# Patient Record
Sex: FEMALE | Race: WHITE | NOT HISPANIC OR LATINO | Employment: FULL TIME | URBAN - METROPOLITAN AREA
[De-identification: names, ages, dates, MRNs, and addresses within clinical notes are randomized per-mention and may not be internally consistent; named-entity substitution may affect disease eponyms.]

---

## 2017-06-21 ENCOUNTER — TRANSCRIBE ORDERS (OUTPATIENT)
Dept: LAB | Facility: CLINIC | Age: 37
End: 2017-06-21

## 2017-06-21 ENCOUNTER — APPOINTMENT (OUTPATIENT)
Dept: LAB | Facility: CLINIC | Age: 37
End: 2017-06-21
Payer: COMMERCIAL

## 2017-06-21 DIAGNOSIS — Z00.00 ROUTINE GENERAL MEDICAL EXAMINATION AT A HEALTH CARE FACILITY: ICD-10-CM

## 2017-06-21 DIAGNOSIS — Z13.9 SCREENING FOR CONDITION: ICD-10-CM

## 2017-06-21 DIAGNOSIS — Z11.4 SCREENING FOR HUMAN IMMUNODEFICIENCY VIRUS: ICD-10-CM

## 2017-06-21 DIAGNOSIS — Z13.6 SCREENING FOR ISCHEMIC HEART DISEASE: Primary | ICD-10-CM

## 2017-06-21 DIAGNOSIS — Z13.6 SCREENING FOR ISCHEMIC HEART DISEASE: ICD-10-CM

## 2017-06-21 LAB
ANION GAP SERPL CALCULATED.3IONS-SCNC: 10 MMOL/L (ref 4–13)
BUN SERPL-MCNC: 14 MG/DL (ref 5–25)
CALCIUM SERPL-MCNC: 9.1 MG/DL (ref 8.3–10.1)
CHLORIDE SERPL-SCNC: 108 MMOL/L (ref 100–108)
CHOLEST SERPL-MCNC: 122 MG/DL (ref 50–200)
CO2 SERPL-SCNC: 23 MMOL/L (ref 21–32)
CREAT SERPL-MCNC: 0.84 MG/DL (ref 0.6–1.3)
ERYTHROCYTE [DISTWIDTH] IN BLOOD BY AUTOMATED COUNT: 13.8 % (ref 11.6–15.1)
GFR SERPL CREATININE-BSD FRML MDRD: >60 ML/MIN/1.73SQ M
GLUCOSE P FAST SERPL-MCNC: 103 MG/DL (ref 65–99)
HCT VFR BLD AUTO: 44.8 % (ref 34.8–46.1)
HDLC SERPL-MCNC: 39 MG/DL (ref 40–60)
HGB BLD-MCNC: 14.3 G/DL (ref 11.5–15.4)
LDLC SERPL CALC-MCNC: 74 MG/DL (ref 0–100)
MCH RBC QN AUTO: 28.8 PG (ref 26.8–34.3)
MCHC RBC AUTO-ENTMCNC: 31.9 G/DL (ref 31.4–37.4)
MCV RBC AUTO: 90 FL (ref 82–98)
PLATELET # BLD AUTO: 292 THOUSANDS/UL (ref 149–390)
PMV BLD AUTO: 10.2 FL (ref 8.9–12.7)
POTASSIUM SERPL-SCNC: 3.9 MMOL/L (ref 3.5–5.3)
RBC # BLD AUTO: 4.97 MILLION/UL (ref 3.81–5.12)
SODIUM SERPL-SCNC: 141 MMOL/L (ref 136–145)
TRIGL SERPL-MCNC: 44 MG/DL
WBC # BLD AUTO: 7.22 THOUSAND/UL (ref 4.31–10.16)

## 2017-06-21 PROCEDURE — 36415 COLL VENOUS BLD VENIPUNCTURE: CPT

## 2017-06-21 PROCEDURE — 80061 LIPID PANEL: CPT

## 2017-06-21 PROCEDURE — 85027 COMPLETE CBC AUTOMATED: CPT

## 2017-06-21 PROCEDURE — 87389 HIV-1 AG W/HIV-1&-2 AB AG IA: CPT

## 2017-06-21 PROCEDURE — 80048 BASIC METABOLIC PNL TOTAL CA: CPT

## 2017-06-23 LAB — HIV 1+2 AB+HIV1 P24 AG SERPL QL IA: NORMAL

## 2017-12-23 ENCOUNTER — APPOINTMENT (OUTPATIENT)
Dept: LAB | Age: 37
End: 2017-12-23
Attending: PREVENTIVE MEDICINE

## 2017-12-23 ENCOUNTER — TRANSCRIBE ORDERS (OUTPATIENT)
Dept: ADMINISTRATIVE | Age: 37
End: 2017-12-23

## 2017-12-23 DIAGNOSIS — Z02.1 PRE-EMPLOYMENT EXAMINATION: Primary | ICD-10-CM

## 2017-12-23 DIAGNOSIS — Z02.1 PRE-EMPLOYMENT EXAMINATION: ICD-10-CM

## 2017-12-23 LAB — RUBV IGG SERPL IA-ACNC: 8.7 IU/ML

## 2017-12-23 PROCEDURE — 86765 RUBEOLA ANTIBODY: CPT

## 2017-12-23 PROCEDURE — 86480 TB TEST CELL IMMUN MEASURE: CPT

## 2017-12-23 PROCEDURE — 86735 MUMPS ANTIBODY: CPT

## 2017-12-23 PROCEDURE — 86762 RUBELLA ANTIBODY: CPT

## 2017-12-23 PROCEDURE — 36415 COLL VENOUS BLD VENIPUNCTURE: CPT

## 2017-12-23 PROCEDURE — 86787 VARICELLA-ZOSTER ANTIBODY: CPT

## 2017-12-26 LAB
ANNOTATION COMMENT IMP: NORMAL
GAMMA INTERFERON BACKGROUND BLD IA-ACNC: 0.13 IU/ML
M TB IFN-G BLD-IMP: NEGATIVE
M TB IFN-G CD4+ BCKGRND COR BLD-ACNC: <0.01 IU/ML
M TB IFN-G CD4+ T-CELLS BLD-ACNC: 0.05 IU/ML
MEV IGG SER QL: NORMAL
MITOGEN IGNF BLD-ACNC: >10 IU/ML
MUV IGG SER QL: NORMAL
QUANTIFERON-TB GOLD IN TUBE: NORMAL
SERVICE CMNT-IMP: NORMAL
VZV IGG SER IA-ACNC: NORMAL

## 2018-02-23 ENCOUNTER — OFFICE VISIT (OUTPATIENT)
Dept: FAMILY MEDICINE CLINIC | Facility: CLINIC | Age: 38
End: 2018-02-23
Payer: COMMERCIAL

## 2018-02-23 VITALS — HEART RATE: 72 BPM | DIASTOLIC BLOOD PRESSURE: 72 MMHG | SYSTOLIC BLOOD PRESSURE: 120 MMHG | RESPIRATION RATE: 16 BRPM

## 2018-02-23 DIAGNOSIS — E66.3 OVERWEIGHT: ICD-10-CM

## 2018-02-23 DIAGNOSIS — R06.02 SHORTNESS OF BREATH: Primary | ICD-10-CM

## 2018-02-23 PROCEDURE — 93000 ELECTROCARDIOGRAM COMPLETE: CPT | Performed by: FAMILY MEDICINE

## 2018-02-23 PROCEDURE — 99204 OFFICE O/P NEW MOD 45 MIN: CPT | Performed by: FAMILY MEDICINE

## 2018-02-23 NOTE — ASSESSMENT & PLAN NOTE
Jerica is stable on exam   She is to f/u here in 1 month, or sooner PRN  Pt has not been ill, and this does not appear to be pathologic from a CV/Pulm perspective  Suspect here more deconditioning and anxiousness  Precautions were given to the pt today that if the symptoms worsen, she is to call, or be taken to the ER   ECG today was overall reassuring  Discussed with the pt getting back to some regular, low impact exercise  We will check FBW as well

## 2018-02-23 NOTE — ASSESSMENT & PLAN NOTE
We discussed options at length today  We will start her on Contrave, and see how she does  Pt is to work on a lower carb diet, and regular exercise, to assist with weight loss  FBW - with TSH and CBC included - was ordered

## 2018-02-23 NOTE — PROGRESS NOTES
Assessment/Plan:    Shortness of breath  Crystal is stable on exam   She is to f/u here in 1 month, or sooner PRN  Pt has not been ill, and this does not appear to be pathologic from a CV/Pulm perspective  Suspect here more deconditioning and anxiousness  Precautions were given to the pt today that if the symptoms worsen, she is to call, or be taken to the ER   ECG today was overall reassuring  Discussed with the pt getting back to some regular, low impact exercise  We will check FBW as well  Overweight  We discussed options at length today  We will start her on Contrave, and see how she does  Pt is to work on a lower carb diet, and regular exercise, to assist with weight loss  FBW - with TSH and CBC included - was ordered  Diagnoses and all orders for this visit:    Shortness of breath  -     CBC and differential; Future  -     Comprehensive metabolic panel; Future  -     Lipid panel; Future  -     TSH, 3rd generation with T4 reflex; Future  -     POCT ECG    Overweight  -     CBC and differential; Future  -     Comprehensive metabolic panel; Future  -     Lipid panel; Future  -     TSH, 3rd generation with T4 reflex; Future  -     Discontinue: Naltrexone-Bupropion HCl ER (CONTRAVE) 8-90 MG TB12; Take 1 tab in the morning x 1 week, then 1 tab twice daily for a week, then 2 tabs in the AM and 1 tab in the PM for a week, then 2 tabs twice daily after that (and maintain)  -     Discontinue: Naltrexone-Bupropion HCl ER (CONTRAVE) 8-90 MG TB12; Take 1 tab in the morning x 1 week, then 1 tab twice daily for a week, then 2 tabs in the AM and 1 tab in the PM for a week, then 2 tabs twice daily after that (and maintain)  -     Naltrexone-Bupropion HCl ER (CONTRAVE) 8-90 MG TB12; Take 1 tab in the AM x 1 week, then 1 tab twice daily x 1 week, then 2 tabs in the AM / 1 tab in the PM x 1 week, then 2 tabs twice daily  Subjective:      Patient ID: Bro Irizarry is a 30 y o  female      Pt has struggled with weight loss for some time - did well with Phentiramine and Topamax in the past, but struggled with side effects on Phentiramine (heart racing, back pain, etc)  Topamax changed mood adversely  Exercising has lapsed in recent months  Diet also off again  Pt with c/o of SOB over the last 3 days, worse at night -   Non-smoker  Strong family hx of CAD  No personal or famhx of asthma  No recent illnesses  Pt is not pregnant - partner with vasectomy  Has 1 child  The following portions of the patient's history were reviewed and updated as appropriate: allergies, current medications, past family history, past social history, past surgical history and problem list     Past Medical History:   Diagnosis Date    SOB (shortness of breath)      No past surgical history on file  Current Outpatient Prescriptions:     Naltrexone-Bupropion HCl ER (CONTRAVE) 8-90 MG TB12, Take 1 tab in the AM x 1 week, then 1 tab twice daily x 1 week, then 2 tabs in the AM / 1 tab in the PM x 1 week, then 2 tabs twice daily  , Disp: 120 tablet, Rfl: 2    Allergies   Allergen Reactions    Azithromycin Other (See Comments)     other    Cephalexin Other (See Comments)     Other      Codeine Hives    Promethazine Other (See Comments)     Other      Sulfamethoxazole-Trimethoprim Hives     Family History   Problem Relation Age of Onset    Diabetes Mother     Hypertension Mother      Social History     Social History    Marital status: /Civil Union     Spouse name: N/A    Number of children: N/A    Years of education: N/A     Occupational History    Not on file       Social History Main Topics    Smoking status: Never Smoker    Smokeless tobacco: Never Used    Alcohol use Yes      Comment: social    Drug use: No    Sexual activity: Yes     Partners: Male     Birth control/ protection: Male Sterilization     Other Topics Concern    Not on file     Social History Narrative    No narrative on file         Review of Systems   Constitutional: Negative for fever  HENT: Negative for congestion and rhinorrhea  Respiratory: Positive for chest tightness and shortness of breath  Mild wheeze  Mild cough last night only  Cardiovascular: Positive for chest pain  Negative for palpitations  Mild CP  Gastrointestinal: Negative for abdominal pain and blood in stool  No heartburn  Psychiatric/Behavioral: The patient is nervous/anxious  Objective:      /72 (BP Location: Left arm, Patient Position: Sitting, Cuff Size: Standard)   Pulse 72   Resp 16          Physical Exam   Constitutional: She is oriented to person, place, and time  She appears well-developed and well-nourished  No distress  HENT:   Head: Normocephalic and atraumatic  Right Ear: Hearing, tympanic membrane, external ear and ear canal normal    Left Ear: Hearing, tympanic membrane, external ear and ear canal normal    Mouth/Throat: Oropharynx is clear and moist  No oropharyngeal exudate  Eyes: Conjunctivae are normal    Neck: Normal range of motion  Neck supple  No thyromegaly present  Cardiovascular: Normal rate, regular rhythm and normal heart sounds  Exam reveals no gallop and no friction rub  No murmur heard  Pulmonary/Chest: Effort normal and breath sounds normal  No respiratory distress  She has no wheezes  She has no rales  Abdominal: Soft  Bowel sounds are normal  She exhibits no distension and no mass  There is no tenderness  There is no rebound and no guarding  Musculoskeletal: She exhibits no edema  Legs:  Lymphadenopathy:     She has no cervical adenopathy  Neurological: She is alert and oriented to person, place, and time  Skin: She is not diaphoretic  Psychiatric: She has a normal mood and affect  Her behavior is normal  Judgment and thought content normal    Nursing note and vitals reviewed

## 2018-04-17 ENCOUNTER — APPOINTMENT (OUTPATIENT)
Dept: LAB | Facility: HOSPITAL | Age: 38
End: 2018-04-17
Payer: COMMERCIAL

## 2018-04-17 ENCOUNTER — TRANSCRIBE ORDERS (OUTPATIENT)
Dept: LAB | Facility: HOSPITAL | Age: 38
End: 2018-04-17

## 2018-04-17 DIAGNOSIS — E66.3 OVERWEIGHT: ICD-10-CM

## 2018-04-17 DIAGNOSIS — Z01.812 PRE-OPERATIVE LABORATORY EXAMINATION: Primary | ICD-10-CM

## 2018-04-17 DIAGNOSIS — Z01.812 PRE-OPERATIVE LABORATORY EXAMINATION: ICD-10-CM

## 2018-04-17 DIAGNOSIS — R06.02 SHORTNESS OF BREATH: ICD-10-CM

## 2018-04-17 LAB
ALBUMIN SERPL BCP-MCNC: 3.6 G/DL (ref 3.5–5)
ALP SERPL-CCNC: 85 U/L (ref 46–116)
ALT SERPL W P-5'-P-CCNC: 15 U/L (ref 12–78)
ANION GAP SERPL CALCULATED.3IONS-SCNC: 8 MMOL/L (ref 4–13)
APTT PPP: 26 SECONDS (ref 23–35)
AST SERPL W P-5'-P-CCNC: 11 U/L (ref 5–45)
BASOPHILS # BLD AUTO: 0.02 THOUSANDS/ΜL (ref 0–0.1)
BASOPHILS NFR BLD AUTO: 0 % (ref 0–1)
BILIRUB SERPL-MCNC: 0.45 MG/DL (ref 0.2–1)
BUN SERPL-MCNC: 16 MG/DL (ref 5–25)
CALCIUM SERPL-MCNC: 8.8 MG/DL (ref 8.3–10.1)
CHLORIDE SERPL-SCNC: 109 MMOL/L (ref 100–108)
CHOLEST SERPL-MCNC: 147 MG/DL (ref 50–200)
CO2 SERPL-SCNC: 24 MMOL/L (ref 21–32)
CREAT SERPL-MCNC: 0.68 MG/DL (ref 0.6–1.3)
EOSINOPHIL # BLD AUTO: 0.19 THOUSAND/ΜL (ref 0–0.61)
EOSINOPHIL NFR BLD AUTO: 2 % (ref 0–6)
ERYTHROCYTE [DISTWIDTH] IN BLOOD BY AUTOMATED COUNT: 13.2 % (ref 11.6–15.1)
GFR SERPL CREATININE-BSD FRML MDRD: 114 ML/MIN/1.73SQ M
GLUCOSE P FAST SERPL-MCNC: 89 MG/DL (ref 65–99)
HBV SURFACE AG SER QL: NORMAL
HCT VFR BLD AUTO: 45.8 % (ref 34.8–46.1)
HCV AB SER QL: NORMAL
HDLC SERPL-MCNC: 49 MG/DL (ref 40–60)
HGB BLD-MCNC: 14.4 G/DL (ref 11.5–15.4)
INR PPP: 0.99 (ref 0.86–1.16)
LDLC SERPL CALC-MCNC: 83 MG/DL (ref 0–100)
LYMPHOCYTES # BLD AUTO: 2.8 THOUSANDS/ΜL (ref 0.6–4.47)
LYMPHOCYTES NFR BLD AUTO: 34 % (ref 14–44)
MCH RBC QN AUTO: 29.6 PG (ref 26.8–34.3)
MCHC RBC AUTO-ENTMCNC: 31.4 G/DL (ref 31.4–37.4)
MCV RBC AUTO: 94 FL (ref 82–98)
MONOCYTES # BLD AUTO: 0.54 THOUSAND/ΜL (ref 0.17–1.22)
MONOCYTES NFR BLD AUTO: 7 % (ref 4–12)
NEUTROPHILS # BLD AUTO: 4.64 THOUSANDS/ΜL (ref 1.85–7.62)
NEUTS SEG NFR BLD AUTO: 57 % (ref 43–75)
NONHDLC SERPL-MCNC: 98 MG/DL
NRBC BLD AUTO-RTO: 0 /100 WBCS
PLATELET # BLD AUTO: 293 THOUSANDS/UL (ref 149–390)
PMV BLD AUTO: 10 FL (ref 8.9–12.7)
POTASSIUM SERPL-SCNC: 3.7 MMOL/L (ref 3.5–5.3)
PROT SERPL-MCNC: 7.6 G/DL (ref 6.4–8.2)
PROTHROMBIN TIME: 13.1 SECONDS (ref 12.1–14.4)
RBC # BLD AUTO: 4.86 MILLION/UL (ref 3.81–5.12)
SODIUM SERPL-SCNC: 141 MMOL/L (ref 136–145)
TRIGL SERPL-MCNC: 76 MG/DL
TSH SERPL DL<=0.05 MIU/L-ACNC: 2.76 UIU/ML (ref 0.36–3.74)
WBC # BLD AUTO: 8.21 THOUSAND/UL (ref 4.31–10.16)

## 2018-04-17 PROCEDURE — 80061 LIPID PANEL: CPT

## 2018-04-17 PROCEDURE — 85025 COMPLETE CBC W/AUTO DIFF WBC: CPT

## 2018-04-17 PROCEDURE — 85610 PROTHROMBIN TIME: CPT

## 2018-04-17 PROCEDURE — 87389 HIV-1 AG W/HIV-1&-2 AB AG IA: CPT

## 2018-04-17 PROCEDURE — 84443 ASSAY THYROID STIM HORMONE: CPT

## 2018-04-17 PROCEDURE — 86803 HEPATITIS C AB TEST: CPT

## 2018-04-17 PROCEDURE — 85730 THROMBOPLASTIN TIME PARTIAL: CPT

## 2018-04-17 PROCEDURE — 36415 COLL VENOUS BLD VENIPUNCTURE: CPT

## 2018-04-17 PROCEDURE — 87340 HEPATITIS B SURFACE AG IA: CPT

## 2018-04-17 PROCEDURE — 80053 COMPREHEN METABOLIC PANEL: CPT

## 2018-04-18 LAB — HIV 1+2 AB+HIV1 P24 AG SERPL QL IA: NORMAL

## 2018-08-09 ENCOUNTER — APPOINTMENT (OUTPATIENT)
Dept: LAB | Facility: HOSPITAL | Age: 38
End: 2018-08-09

## 2018-08-09 ENCOUNTER — TRANSCRIBE ORDERS (OUTPATIENT)
Dept: LAB | Facility: HOSPITAL | Age: 38
End: 2018-08-09

## 2018-08-09 DIAGNOSIS — Z00.8 HEALTH EXAMINATION IN POPULATION SURVEY: Primary | ICD-10-CM

## 2018-08-09 DIAGNOSIS — Z00.8 HEALTH EXAMINATION IN POPULATION SURVEY: ICD-10-CM

## 2018-08-09 LAB
CHOLEST SERPL-MCNC: 159 MG/DL (ref 50–200)
EST. AVERAGE GLUCOSE BLD GHB EST-MCNC: 111 MG/DL
HBA1C MFR BLD: 5.5 % (ref 4.2–6.3)
HDLC SERPL-MCNC: 51 MG/DL (ref 40–60)
LDLC SERPL CALC-MCNC: 86 MG/DL (ref 0–100)
NONHDLC SERPL-MCNC: 108 MG/DL
TRIGL SERPL-MCNC: 111 MG/DL

## 2018-08-09 PROCEDURE — 80061 LIPID PANEL: CPT

## 2018-08-09 PROCEDURE — 36415 COLL VENOUS BLD VENIPUNCTURE: CPT

## 2018-08-09 PROCEDURE — 83036 HEMOGLOBIN GLYCOSYLATED A1C: CPT

## 2018-08-15 ENCOUNTER — APPOINTMENT (OUTPATIENT)
Dept: LAB | Facility: CLINIC | Age: 38
End: 2018-08-15
Payer: COMMERCIAL

## 2018-08-15 ENCOUNTER — OFFICE VISIT (OUTPATIENT)
Dept: FAMILY MEDICINE CLINIC | Facility: CLINIC | Age: 38
End: 2018-08-15
Payer: COMMERCIAL

## 2018-08-15 VITALS
RESPIRATION RATE: 16 BRPM | WEIGHT: 199.8 LBS | HEART RATE: 65 BPM | SYSTOLIC BLOOD PRESSURE: 112 MMHG | DIASTOLIC BLOOD PRESSURE: 72 MMHG

## 2018-08-15 DIAGNOSIS — J32.0 MAXILLARY SINUSITIS, UNSPECIFIED CHRONICITY: ICD-10-CM

## 2018-08-15 DIAGNOSIS — N20.0 NEPHROLITHIASIS: Chronic | ICD-10-CM

## 2018-08-15 DIAGNOSIS — E66.3 OVERWEIGHT: Chronic | ICD-10-CM

## 2018-08-15 DIAGNOSIS — J32.0 MAXILLARY SINUSITIS, UNSPECIFIED CHRONICITY: Primary | ICD-10-CM

## 2018-08-15 PROCEDURE — 99214 OFFICE O/P EST MOD 30 MIN: CPT | Performed by: FAMILY MEDICINE

## 2018-08-15 PROCEDURE — 82360 CALCULUS ASSAY QUANT: CPT | Performed by: FAMILY MEDICINE

## 2018-08-15 RX ORDER — AMOXICILLIN AND CLAVULANATE POTASSIUM 875; 125 MG/1; MG/1
1 TABLET, FILM COATED ORAL 2 TIMES DAILY WITH MEALS
Qty: 20 TABLET | Refills: 0 | Status: SHIPPED | OUTPATIENT
Start: 2018-08-15 | End: 2018-08-25

## 2018-08-15 RX ORDER — AMOXICILLIN AND CLAVULANATE POTASSIUM 875; 125 MG/1; MG/1
1 TABLET, FILM COATED ORAL 2 TIMES DAILY WITH MEALS
Qty: 20 TABLET | Refills: 0 | Status: SHIPPED | OUTPATIENT
Start: 2018-08-15 | End: 2018-08-15 | Stop reason: SDUPTHER

## 2018-08-15 NOTE — PROGRESS NOTES
Assessment/Plan:    No problem-specific Assessment & Plan notes found for this encounter  Diagnoses and all orders for this visit:    Maxillary sinusitis, unspecified chronicity  Comments:  Pt is stable on exam - Treating with Augmentin (has taken and tolerated in the past), OTC Cough and Cold Preps PRN, rest, and good PO hydration  Orders:  -     amoxicillin-clavulanate (AUGMENTIN) 875-125 mg per tablet; Take 1 tablet by mouth 2 (two) times a day with meals for 10 days    Overweight  Comments:  Pt is to continue a healthier diet, and regular exercise  Will start her back on Phentermine - discussed potential R/SE of med  [place] PDMP was checked  Orders:  -     Phentermine HCl 15 MG TBDP; Take 1 tablet (15 mg total) by mouth daily in the early morning    Nephrolithiasis  Comments:  Stone analysis ordered - pt has stone  She is to continue to hydrate well, and mix in some lemon water  Orders:  -     Stone analysis          Subjective:      Patient ID: Penelope Barbosa is a 30 y o  female  Passed her third kidney stone last month on vacation - has stone with her now  Sinus issues kicking up over the last few day; +sinus pressure, and has a sick contact  Going to the gym - still not losing weight  Pt had no improvement with Contrave  Had normal TSH (2 76) in 04/2018  Pt is not pregnant  Sinus Problem   Associated symptoms include congestion  Pertinent negatives include no coughing  The following portions of the patient's history were reviewed and updated as appropriate: allergies, current medications, past family history, past social history, past surgical history and problem list     Past Medical History:   Diagnosis Date    SOB (shortness of breath)      Scheduled Meds:  Continuous Infusions:  No current facility-administered medications for this visit  PRN Meds:      Allergies   Allergen Reactions    Azithromycin Other (See Comments)     other    Cephalexin Other (See Comments) Other      Codeine Hives    Promethazine Other (See Comments)     Other      Sulfamethoxazole-Trimethoprim Hives       Review of Systems   Constitutional: Positive for fever and unexpected weight change  Negative for activity change  Continued issues with losing weight  HENT: Positive for congestion, rhinorrhea and sinus pain  Respiratory: Negative for cough  Genitourinary: Negative for dysuria and hematuria  Musculoskeletal:        Still some residual back pain  Objective:      /72 (BP Location: Right arm, Patient Position: Sitting, Cuff Size: Standard)   Pulse 65   Resp 16   Wt 90 6 kg (199 lb 12 8 oz)          Physical Exam   Constitutional: She is oriented to person, place, and time  She appears well-developed and well-nourished  No distress  HENT:   Head: Normocephalic and atraumatic  Right Ear: Hearing, tympanic membrane, external ear and ear canal normal    Left Ear: Hearing, tympanic membrane, external ear and ear canal normal    Nose: Mucosal edema present  Right sinus exhibits maxillary sinus tenderness  Left sinus exhibits maxillary sinus tenderness  Mouth/Throat: Oropharynx is clear and moist  No oropharyngeal exudate  Eyes: Conjunctivae are normal    Neck: Normal range of motion  Neck supple  No thyromegaly present  Cardiovascular: Normal rate, regular rhythm and normal heart sounds  Exam reveals no gallop and no friction rub  No murmur heard  Pulmonary/Chest: Effort normal and breath sounds normal  No respiratory distress  She has no wheezes  She has no rales  Abdominal: Soft  Bowel sounds are normal  She exhibits no distension and no mass  There is no tenderness  There is no rebound, no guarding and no CVA tenderness  Lymphadenopathy:     She has no cervical adenopathy  Neurological: She is alert and oriented to person, place, and time  Skin: She is not diaphoretic  Psychiatric: She has a normal mood and affect   Her behavior is normal  Judgment and thought content normal    Nursing note and vitals reviewed

## 2018-08-16 DIAGNOSIS — E66.3 OVERWEIGHT: Primary | ICD-10-CM

## 2018-08-16 RX ORDER — TOPIRAMATE 50 MG/1
50 TABLET, FILM COATED ORAL EVERY 12 HOURS SCHEDULED
Qty: 180 TABLET | Refills: 1 | Status: SHIPPED | OUTPATIENT
Start: 2018-08-16 | End: 2018-08-16 | Stop reason: SDUPTHER

## 2018-08-16 RX ORDER — TOPIRAMATE 50 MG/1
50 TABLET, FILM COATED ORAL EVERY 12 HOURS SCHEDULED
Qty: 180 TABLET | Refills: 0 | Status: SHIPPED | OUTPATIENT
Start: 2018-08-16 | End: 2018-10-10 | Stop reason: ALTCHOICE

## 2018-08-16 NOTE — TELEPHONE ENCOUNTER
The patient was taking 50mg twice daily & would like to stay with that  She feels the 25mgs won't do enough  Please send the Rx for 50mgs tablets, # 90  to [facility]   NOT [facility]  Thank you!

## 2018-08-16 NOTE — TELEPHONE ENCOUNTER
Also, can someone please call [facility] & give them the OK to fill the Generic Phentermine as our insurance does not cover it  Thank you!

## 2018-08-21 LAB
CA PHOS MFR STONE: 5 %
CALCIUM OXALATE DIHYDRATE MFR STONE IR: 5 %
COLOR STONE: NORMAL
COM MFR STONE: 90 %
COMMENT-STONE3: NORMAL
COMPOSITION: NORMAL
LABORATORY COMMENT REPORT: NORMAL
NIDUS STONE QL: NORMAL
PHOTO: NORMAL
SIZE STONE: NORMAL MM
STONE ANALYSIS-IMP: NORMAL
WT STONE: 6 MG

## 2018-08-22 NOTE — PROGRESS NOTES
Please let the patient know that their kidney stone was the more typical type, Calcium Oxolate  Thanks     Dr Fabiola Farmer

## 2018-10-10 ENCOUNTER — OFFICE VISIT (OUTPATIENT)
Dept: FAMILY MEDICINE CLINIC | Facility: CLINIC | Age: 38
End: 2018-10-10
Payer: COMMERCIAL

## 2018-10-10 VITALS
OXYGEN SATURATION: 98 % | RESPIRATION RATE: 16 BRPM | DIASTOLIC BLOOD PRESSURE: 76 MMHG | WEIGHT: 202.2 LBS | TEMPERATURE: 98.7 F | SYSTOLIC BLOOD PRESSURE: 110 MMHG | HEART RATE: 70 BPM

## 2018-10-10 DIAGNOSIS — F41.8 SITUATIONAL ANXIETY: ICD-10-CM

## 2018-10-10 DIAGNOSIS — A08.4 VIRAL GASTROENTERITIS: Primary | ICD-10-CM

## 2018-10-10 PROCEDURE — 99213 OFFICE O/P EST LOW 20 MIN: CPT | Performed by: FAMILY MEDICINE

## 2018-10-10 RX ORDER — ALPRAZOLAM 0.5 MG/1
0.5 TABLET ORAL 2 TIMES DAILY PRN
Qty: 10 TABLET | Refills: 0 | Status: SHIPPED | OUTPATIENT
Start: 2018-10-10 | End: 2019-01-03 | Stop reason: ALTCHOICE

## 2018-10-10 NOTE — PROGRESS NOTES
Assessment/Plan:    No problem-specific Assessment & Plan notes found for this encounter  Diagnoses and all orders for this visit:    Viral gastroenteritis  Comments:  Likely VGE, that has been in the community  Pt to make diet bland for a couple days, and hydrate well  Note given for work  OTC Zantac PRN  Situational anxiety  Comments:  Pt upset with sudden passing of close relative  Small script for PRN Alprazolam ordered  [place]MP [place]  Orders:  -     ALPRAZolam (XANAX) 0 5 mg tablet; Take 1 tablet (0 5 mg total) by mouth 2 (two) times a day as needed for anxiety or sleep for up to 5 days          Subjective:      Patient ID: Barry Turner is a 41 y o  female  Crystal had a death in the family (suddenly lost her uncle; complication post-surgery)  Wakes up sweating, nauseated, no diarrhea  No vomiting  Had been feverish; resolved  Trouble sleeping  Pt is not pregnant  No clear sick contacts  The following portions of the patient's history were reviewed and updated as appropriate: allergies, current medications, past family history, past social history, past surgical history and problem list     Past Medical History:   Diagnosis Date    SOB (shortness of breath)        Current Outpatient Prescriptions:     ALPRAZolam (XANAX) 0 5 mg tablet, Take 1 tablet (0 5 mg total) by mouth 2 (two) times a day as needed for anxiety or sleep for up to 5 days, Disp: 10 tablet, Rfl: 0    Allergies   Allergen Reactions    Azithromycin Other (See Comments)     other    Cephalexin Other (See Comments)     Other      Codeine Hives    Promethazine Other (See Comments)     Other      Sulfamethoxazole-Trimethoprim Hives       Review of Systems   Constitutional: Negative for activity change and fever  HENT: Positive for congestion  Negative for rhinorrhea  Respiratory: Negative for cough and shortness of breath  Cardiovascular: Negative for chest pain     Gastrointestinal: Positive for nausea  Negative for diarrhea and vomiting  Genitourinary: Negative for dysuria and hematuria  Objective:      /76 (BP Location: Left arm, Patient Position: Sitting, Cuff Size: Standard)   Pulse 70   Temp 98 7 °F (37 1 °C) (Tympanic)   Resp 16   Wt 91 7 kg (202 lb 3 2 oz)   SpO2 98%          Physical Exam   Constitutional: She is oriented to person, place, and time  She appears well-developed and well-nourished  No distress  HENT:   Head: Normocephalic and atraumatic  Right Ear: Hearing, tympanic membrane, external ear and ear canal normal    Left Ear: Hearing, tympanic membrane, external ear and ear canal normal    Mouth/Throat: Oropharynx is clear and moist  No oropharyngeal exudate  Eyes: Conjunctivae are normal    Neck: Normal range of motion  Neck supple  No thyromegaly present  Cardiovascular: Normal rate, regular rhythm and normal heart sounds  Exam reveals no gallop and no friction rub  No murmur heard  Pulmonary/Chest: Effort normal and breath sounds normal  No respiratory distress  She has no wheezes  She has no rales  Abdominal: Soft  Bowel sounds are normal  She exhibits no distension and no mass  There is no tenderness  There is no rebound and no guarding  Lymphadenopathy:     She has no cervical adenopathy  Neurological: She is alert and oriented to person, place, and time  Skin: She is not diaphoretic  Psychiatric: She has a normal mood and affect  Her behavior is normal  Judgment and thought content normal    Nursing note and vitals reviewed

## 2018-11-05 ENCOUNTER — OFFICE VISIT (OUTPATIENT)
Dept: OBGYN CLINIC | Facility: CLINIC | Age: 38
End: 2018-11-05
Payer: COMMERCIAL

## 2018-11-05 VITALS
WEIGHT: 204.8 LBS | DIASTOLIC BLOOD PRESSURE: 72 MMHG | BODY MASS INDEX: 31.04 KG/M2 | HEIGHT: 68 IN | SYSTOLIC BLOOD PRESSURE: 124 MMHG

## 2018-11-05 DIAGNOSIS — Z12.4 CERVICAL CANCER SCREENING: ICD-10-CM

## 2018-11-05 DIAGNOSIS — Z01.419 WELL WOMAN EXAM WITH ROUTINE GYNECOLOGICAL EXAM: ICD-10-CM

## 2018-11-05 DIAGNOSIS — N93.9 ABNORMAL UTERINE BLEEDING: Primary | ICD-10-CM

## 2018-11-05 DIAGNOSIS — Z11.51 SCREENING FOR HPV (HUMAN PAPILLOMAVIRUS): ICD-10-CM

## 2018-11-05 PROCEDURE — G0145 SCR C/V CYTO,THINLAYER,RESCR: HCPCS | Performed by: OBSTETRICS & GYNECOLOGY

## 2018-11-05 PROCEDURE — 87624 HPV HI-RISK TYP POOLED RSLT: CPT | Performed by: OBSTETRICS & GYNECOLOGY

## 2018-11-05 PROCEDURE — 99385 PREV VISIT NEW AGE 18-39: CPT | Performed by: OBSTETRICS & GYNECOLOGY

## 2018-11-05 NOTE — PROGRESS NOTES
ASSESSMENT & PLAN: Sandra Duff is a 41 y o  [id number] with normal gynecologic exam     1   Routine well woman exam done today  2    Pap and HPV:Pap with HPV was done today  Current ASCCP Guidelines reviewed  3   The patient declined STD testing  Safe sex practices have been discussed  4  The patient is sexually active  She declined contraception and options have been discussed  Her  is s/p vasectomy  5  AUB: Intermenstrual bleeding x 3 cycles  Uterus bulky on exam today  TVUS ordered  Pap collected  6  The following were reviewed in today's visit: breast self exam, adequate intake of calcium and vitamin D, exercise and healthy diet  5  Patient to return to office in 3 months for AUB follow up  All questions have been answered to her satisfaction  CC:  Annual Gynecologic Examination    HPI: Sandra Duff is a 41 y o  [id number] who presents for annual gynecologic examination  She has the following concerns: irregular periods  Over the past 2 cycles she has had bleeding in between her periods which has not happened in the past  She is keeping track of her dates and they are as follows:  Menses started 8/29 x 6 days then had spotting on 9/14 requiring pantyliner  Next menses was 9/26 - 10/2 then had spotting on 10/12-13 again requiring pantyliner  LMP 10/24-10/29  Heaviest day is on day 3 of the cycle with passage of large clots  Endorses significant dysmenorrhea, worse on day 3, improves with motrin and heating pad use  Denies bleeding after sex but endorses sometimes feeling vaginal irritation  She also notices pain around the time of ovulation that is crampy, dull and constant  This pain is not nearly as bad as her dysmenorrhea  Health Maintenance:    She exercises 0  days per week  She wears her seatbelt routinely  She does perform regular monthly self breast exams  She feels safe at home  She does follow a balanced diet      She does not use tobacco    Past Medical History:   Diagnosis Date    SOB (shortness of breath)        History reviewed  No pertinent surgical history  Past OB/Gyn History:  Period Cycle (Days): 28  Period Duration (Days): 7  Period Pattern: Regular  Menstrual Flow: Light, Heavy  Menstrual Control: Tampon  Menstrual Control Change Freq (Hours): 2  Dysmenorrhea: (!) Severe  Dysmenorrhea Symptoms: Cramping, Nausea, Diarrhea, HeadacheNo LMP recorded  Patient is currently sexually active: heterosexual  Birth control:vasectomy    Last Pap  2016 :  no abnormalities    Obstetric History       T1      L1     SAB0   TAB2   Ectopic0   Multiple0   Live Births1       # Outcome Date GA Lbr Corky/2nd Weight Sex Delivery Anes PTL Lv   3 Term      Vag-Spont   POLO   2 TAB            1 TAB                   Family History:  Family History   Problem Relation Age of Onset    Diabetes Mother     Hypertension Mother        Social History:  Social History     Social History    Marital status: /Civil Union     Spouse name: N/A    Number of children: N/A    Years of education: N/A     Occupational History    Not on file  Social History Main Topics    Smoking status: Never Smoker    Smokeless tobacco: Never Used    Alcohol use Yes      Comment: social    Drug use: No    Sexual activity: Yes     Partners: Male     Birth control/ protection: Male Sterilization     Other Topics Concern    Not on file     Social History Narrative    No narrative on file     Presently lives with her  and child  Patient is   Patient is currently employed - [employer]   Allergies:   Allergies   Allergen Reactions    Azithromycin Other (See Comments)     other    Cephalexin Other (See Comments)     Other      Codeine Hives    Promethazine Other (See Comments)     Other      Sulfamethoxazole-Trimethoprim Hives       Medications:    Current Outpatient Prescriptions:     ALPRAZolam (XANAX) 0 5 mg tablet, Take 1 tablet (0 5 mg total) by mouth 2 (two) times a day as needed for anxiety or sleep for up to 5 days, Disp: 10 tablet, Rfl: 0    Review of Systems:  Denies fevers, chills, unintentional weight loss, excessive fatigue, chest pain, shortness of breath, abdominal pain, nausea, vomiting, urinary incontinence, urinary frequency, vaginal bleeding, vaginal discharge  All other systems negative unless otherwise stated  Physical Exam:  /72 (BP Location: Right arm, Patient Position: Sitting, Cuff Size: Large)   Ht 5' 8" (1 727 m)   Wt 92 9 kg (204 lb 12 8 oz)   BMI 31 14 kg/m²  Body mass index is 31 14 kg/m²  GEN: The patient was alert and oriented x3, pleasant well-appearing female in no acute distress  HEENT:  Unremarkable, no anterior or posterior lymphadenopathy, no thyromegaly  CV:  Regular rate and rhythm, normal S1 and S2, no murmurs  RESP:  Clear to auscultation bilaterally, no wheezes, rales or rhonchi  BREAST:  Symmetric breasts with no palpable breast masses or obvious breast lesions  She has no retractions or nipple discharge  She has no axillary abnormalities or palpable masses  GI:  Soft, nontender, non-distended  MSK: bilateral lower extremities are nontender, no edema  : Normal appearing external female genitalia, normal appearing urethral meatus  On sterile speculum exam, normal appearing vaginal epithelium, no vaginal discharge, no bleeding, grossly normal appearing cervix  On bimanual exam, no cervical motion tenderness; uterus is smooth, retroverted, mobile, nontender 10-12 weeks size  No tenderness or fullness in the bilateral adnexa

## 2018-11-09 LAB
HPV HR 12 DNA CVX QL NAA+PROBE: NEGATIVE
HPV16 DNA CVX QL NAA+PROBE: NEGATIVE
HPV18 DNA CVX QL NAA+PROBE: NEGATIVE

## 2018-11-12 LAB
LAB AP GYN PRIMARY INTERPRETATION: NORMAL
Lab: NORMAL

## 2019-01-03 ENCOUNTER — TELEPHONE (OUTPATIENT)
Dept: FAMILY MEDICINE CLINIC | Facility: CLINIC | Age: 39
End: 2019-01-03

## 2019-01-03 ENCOUNTER — OFFICE VISIT (OUTPATIENT)
Dept: FAMILY MEDICINE CLINIC | Facility: CLINIC | Age: 39
End: 2019-01-03
Payer: COMMERCIAL

## 2019-01-03 VITALS
SYSTOLIC BLOOD PRESSURE: 114 MMHG | HEIGHT: 68 IN | TEMPERATURE: 100 F | RESPIRATION RATE: 18 BRPM | DIASTOLIC BLOOD PRESSURE: 74 MMHG | BODY MASS INDEX: 32.28 KG/M2 | OXYGEN SATURATION: 96 % | HEART RATE: 99 BPM | WEIGHT: 213 LBS

## 2019-01-03 DIAGNOSIS — R68.89 FLU-LIKE SYMPTOMS: Primary | ICD-10-CM

## 2019-01-03 PROBLEM — Z83.3 FAMILY HISTORY OF TYPE 2 DIABETES MELLITUS: Status: ACTIVE | Noted: 2017-06-23

## 2019-01-03 PROBLEM — R06.02 SHORTNESS OF BREATH: Status: RESOLVED | Noted: 2018-02-23 | Resolved: 2019-01-03

## 2019-01-03 PROCEDURE — 99213 OFFICE O/P EST LOW 20 MIN: CPT | Performed by: FAMILY MEDICINE

## 2019-01-03 PROCEDURE — 3008F BODY MASS INDEX DOCD: CPT | Performed by: FAMILY MEDICINE

## 2019-01-03 PROCEDURE — 1036F TOBACCO NON-USER: CPT | Performed by: FAMILY MEDICINE

## 2019-01-03 RX ORDER — OSELTAMIVIR PHOSPHATE 75 MG/1
75 CAPSULE ORAL 2 TIMES DAILY
Qty: 10 CAPSULE | Refills: 0 | Status: SHIPPED | OUTPATIENT
Start: 2019-01-03 | End: 2019-01-08

## 2019-01-03 NOTE — TELEPHONE ENCOUNTER
Jerica's  has the flu & now she has symptoms  We had no sick appts so I overbooked a Phyiscal?  Is that OK? If not I will cancel her  Thank you!

## 2019-01-03 NOTE — PROGRESS NOTES
Assessment/Plan:    No problem-specific Assessment & Plan notes found for this encounter  Diagnoses and all orders for this visit:    Flu-like symptoms  -     oseltamivir (TAMIFLU) 75 mg capsule; Take 1 capsule (75 mg total) by mouth 2 (two) times a day for 5 days      patient doesn't want to be swabbed for flu  Will treat based on clinical symptoms   Work note given     Subjective:      Patient ID: Devante Jennings is a 41 y o  female  URI    This is a new problem  The current episode started in the past 7 days  The maximum temperature recorded prior to her arrival was 101 - 101 9 F  Associated symptoms include congestion, coughing and headaches  Pertinent negatives include no abdominal pain, chest pain, diarrhea, dysuria, ear pain, joint pain, joint swelling, nausea, neck pain, plugged ear sensation, rash, rhinorrhea, sinus pain, sneezing, sore throat, swollen glands, vomiting or wheezing  She has tried nothing for the symptoms  The treatment provided mild relief    + exposure to flu - her      The following portions of the patient's history were reviewed and updated as appropriate: allergies, current medications, past family history, past medical history, past social history, past surgical history and problem list     Review of Systems   HENT: Positive for congestion  Negative for ear pain, rhinorrhea, sinus pain, sneezing and sore throat  Respiratory: Positive for cough  Negative for wheezing  Cardiovascular: Negative for chest pain  Gastrointestinal: Negative for abdominal pain, diarrhea, nausea and vomiting  Genitourinary: Negative for dysuria  Musculoskeletal: Negative for joint pain and neck pain  Skin: Negative for rash  Neurological: Positive for headaches           Objective:      /74 (BP Location: Left arm, Patient Position: Sitting, Cuff Size: Standard)   Pulse 99   Temp 100 °F (37 8 °C)   Resp 18   Ht 5' 8" (1 727 m)   Wt 96 6 kg (213 lb)   SpO2 96%   BMI 32 39 kg/m²          Physical Exam   Constitutional: She appears well-developed and well-nourished  HENT:   Head: Normocephalic and atraumatic  Mouth/Throat: No oropharyngeal exudate  Eyes: Pupils are equal, round, and reactive to light  EOM are normal    Neck: No tracheal deviation present  No thyromegaly present  Cardiovascular: Normal rate and regular rhythm  Pulmonary/Chest: Effort normal and breath sounds normal  No respiratory distress  She has no wheezes

## 2019-01-07 ENCOUNTER — TELEPHONE (OUTPATIENT)
Dept: FAMILY MEDICINE CLINIC | Facility: CLINIC | Age: 39
End: 2019-01-07

## 2019-01-07 NOTE — TELEPHONE ENCOUNTER
Patient was seen last Thursday and was diagnosed with flu, and patient was to return to work today but she still has a fever  Patient is requesting the note to be extended just for today and to return tomorrow (as long as the fever is gone)  Please advise  Patient will come by to

## 2019-05-09 ENCOUNTER — OFFICE VISIT (OUTPATIENT)
Dept: OBGYN CLINIC | Facility: CLINIC | Age: 39
End: 2019-05-09
Payer: COMMERCIAL

## 2019-05-09 VITALS — BODY MASS INDEX: 33.15 KG/M2 | WEIGHT: 218 LBS | SYSTOLIC BLOOD PRESSURE: 116 MMHG | DIASTOLIC BLOOD PRESSURE: 82 MMHG

## 2019-05-09 DIAGNOSIS — L73.1 INGROWN HAIR: Primary | ICD-10-CM

## 2019-05-09 PROCEDURE — 99213 OFFICE O/P EST LOW 20 MIN: CPT | Performed by: OBSTETRICS & GYNECOLOGY

## 2019-07-16 ENCOUNTER — OFFICE VISIT (OUTPATIENT)
Dept: FAMILY MEDICINE CLINIC | Facility: CLINIC | Age: 39
End: 2019-07-16
Payer: COMMERCIAL

## 2019-07-16 VITALS
TEMPERATURE: 99 F | BODY MASS INDEX: 33.34 KG/M2 | RESPIRATION RATE: 16 BRPM | WEIGHT: 220 LBS | HEART RATE: 101 BPM | DIASTOLIC BLOOD PRESSURE: 90 MMHG | OXYGEN SATURATION: 98 % | SYSTOLIC BLOOD PRESSURE: 140 MMHG | HEIGHT: 68 IN

## 2019-07-16 DIAGNOSIS — J01.01 ACUTE RECURRENT MAXILLARY SINUSITIS: Primary | ICD-10-CM

## 2019-07-16 PROCEDURE — 99213 OFFICE O/P EST LOW 20 MIN: CPT | Performed by: FAMILY MEDICINE

## 2019-07-16 RX ORDER — AMOXICILLIN AND CLAVULANATE POTASSIUM 875; 125 MG/1; MG/1
1 TABLET, FILM COATED ORAL 2 TIMES DAILY WITH MEALS
Qty: 20 TABLET | Refills: 0 | Status: SHIPPED | OUTPATIENT
Start: 2019-07-16 | End: 2019-07-26

## 2019-07-16 NOTE — PROGRESS NOTES
Assessment/Plan:    No problem-specific Assessment & Plan notes found for this encounter  Diagnoses and all orders for this visit:    Acute recurrent maxillary sinusitis  Comments:  Pt stable on exam   Treating with Augmentin, warm salt water gargles, OTC Cough/Cold Preps PRN, rest, and good PO hydration  Note for work given  Orders:  -     amoxicillin-clavulanate (AUGMENTIN) 875-125 mg per tablet; Take 1 tablet by mouth 2 (two) times a day with meals for 10 days          Subjective:      Patient ID: Vikas Dumont is a 41 y o  female  Crystal has been sick x 3 - 4 days  Leaving for [place] next week  Pt has allergy to Keflex, Bactrim, Azithromycin - she can take Amoxicillin / Augmentin  Pt is not pregnant at this time  The following portions of the patient's history were reviewed and updated as appropriate: allergies, current medications, past family history, past social history, past surgical history and problem list     Past Medical History:   Diagnosis Date    SOB (shortness of breath)        Current Outpatient Medications:     amoxicillin-clavulanate (AUGMENTIN) 875-125 mg per tablet, Take 1 tablet by mouth 2 (two) times a day with meals for 10 days, Disp: 20 tablet, Rfl: 0    Allergies   Allergen Reactions    Azithromycin Other (See Comments)     other    Codeine Hives    Keflex [Cephalexin] Other (See Comments)     Other      Promethazine Other (See Comments)     Other      Sulfamethoxazole-Trimethoprim Hives     Social History     Tobacco Use    Smoking status: Never Smoker    Smokeless tobacco: Never Used   Substance Use Topics    Alcohol use: Yes     Comment: social    Drug use: Never       Review of Systems   Constitutional: Positive for fever  Negative for activity change  Low grade temps  HENT: Positive for congestion, rhinorrhea and voice change  Negative for sore throat  Respiratory: Positive for shortness of breath            Objective:      BP 140/90   Pulse 101   Temp 99 °F (37 2 °C)   Resp 16   Ht 5' 7 95" (1 726 m)   Wt 99 8 kg (220 lb)   SpO2 98%   BMI 33 50 kg/m²          Physical Exam   Constitutional: She is oriented to person, place, and time  She appears well-developed and well-nourished  No distress  HENT:   Head: Normocephalic and atraumatic  Right Ear: Hearing, tympanic membrane, external ear and ear canal normal    Left Ear: Hearing, tympanic membrane, external ear and ear canal normal    Nose: Mucosal edema present  Right sinus exhibits maxillary sinus tenderness  Right sinus exhibits no frontal sinus tenderness  Left sinus exhibits maxillary sinus tenderness  Left sinus exhibits no frontal sinus tenderness  Mouth/Throat: Oropharynx is clear and moist  No oropharyngeal exudate  Eyes: Conjunctivae are normal    Neck: Normal range of motion  Neck supple  No thyromegaly present  Cardiovascular: Normal rate, regular rhythm and normal heart sounds  Exam reveals no gallop and no friction rub  No murmur heard  Pulmonary/Chest: Effort normal and breath sounds normal  No stridor  No respiratory distress  She has no wheezes  She has no rales  Lymphadenopathy:     She has no cervical adenopathy  Neurological: She is alert and oriented to person, place, and time  Skin: She is not diaphoretic  Psychiatric: She has a normal mood and affect  Her behavior is normal  Judgment and thought content normal    Nursing note and vitals reviewed

## 2019-07-16 NOTE — LETTER
July 16, 2019     Patient: Sagrario Martínez   YOB: 1980   Date of Visit: 7/16/2019       To Whom it May Concern:    Sagrario Martínez was seen in my clinic on 7/16/2019  Crystal can return    On 07/18/2019  If you have any questions or concerns, please don't hesitate to call           Sincerely,          Jorge Alberto Handy, DO

## 2019-07-18 ENCOUNTER — OFFICE VISIT (OUTPATIENT)
Dept: FAMILY MEDICINE CLINIC | Facility: CLINIC | Age: 39
End: 2019-07-18
Payer: COMMERCIAL

## 2019-07-18 VITALS
OXYGEN SATURATION: 97 % | BODY MASS INDEX: 32.92 KG/M2 | SYSTOLIC BLOOD PRESSURE: 114 MMHG | HEART RATE: 107 BPM | WEIGHT: 217.2 LBS | DIASTOLIC BLOOD PRESSURE: 86 MMHG | HEIGHT: 68 IN | TEMPERATURE: 99.4 F | RESPIRATION RATE: 16 BRPM

## 2019-07-18 DIAGNOSIS — R05.9 COUGH: Primary | ICD-10-CM

## 2019-07-18 DIAGNOSIS — J01.01 ACUTE RECURRENT MAXILLARY SINUSITIS: ICD-10-CM

## 2019-07-18 PROCEDURE — 99213 OFFICE O/P EST LOW 20 MIN: CPT | Performed by: FAMILY MEDICINE

## 2019-07-18 RX ORDER — PREDNISONE 20 MG/1
40 TABLET ORAL DAILY
Qty: 10 TABLET | Refills: 0 | Status: SHIPPED | OUTPATIENT
Start: 2019-07-18 | End: 2019-07-23

## 2019-07-18 RX ORDER — ALBUTEROL SULFATE 90 UG/1
2 AEROSOL, METERED RESPIRATORY (INHALATION) EVERY 6 HOURS PRN
Qty: 1 INHALER | Refills: 5 | Status: SHIPPED | OUTPATIENT
Start: 2019-07-18 | End: 2019-08-19 | Stop reason: ALTCHOICE

## 2019-07-18 RX ORDER — AZITHROMYCIN 250 MG/1
TABLET, FILM COATED ORAL
Qty: 6 TABLET | Refills: 0 | Status: SHIPPED | OUTPATIENT
Start: 2019-07-18 | End: 2019-07-23

## 2019-07-18 NOTE — PROGRESS NOTES
Assessment/Plan:    No problem-specific Assessment & Plan notes found for this encounter  Diagnoses and all orders for this visit:    Cough  Comments:  Pt stable on exam   Will add Azithromycin to Augmentin, Pred burst, Albuterol PRN wheeze, OTC Cough/Cold Preps PRN, rest, & good PO hydration  Work note  Orders:  -     azithromycin (ZITHROMAX) 250 mg tablet; Take 2 tablets by mouth on day #1, then 1 tab daily for four more days  -     albuterol (PROVENTIL HFA,VENTOLIN HFA) 90 mcg/act inhaler; Inhale 2 puffs every 6 (six) hours as needed for wheezing or shortness of breath  -     predniSONE 20 mg tablet; Take 2 tablets (40 mg total) by mouth daily for 5 days    Acute recurrent maxillary sinusitis          Subjective:      Patient ID: Jacquie Diana is a 41 y o  female  Cough and congestion - cough is still bad  Temp to 102 last night  Pt is not pregnant at this time  Is on Augmentin  Only had hx of GI upset on Azithromycin  Pt reporting ONLY a hx of GI upset on Azithromycin in the past   Worsening cough - Using Azithromycin to expand coverage  Precautions given  The following portions of the patient's history were reviewed and updated as appropriate: allergies, current medications, past family history, past social history, past surgical history and problem list     Past Medical History:   Diagnosis Date    SOB (shortness of breath)        Current Outpatient Medications:     amoxicillin-clavulanate (AUGMENTIN) 875-125 mg per tablet, Take 1 tablet by mouth 2 (two) times a day with meals for 10 days, Disp: 20 tablet, Rfl: 0    albuterol (PROVENTIL HFA,VENTOLIN HFA) 90 mcg/act inhaler, Inhale 2 puffs every 6 (six) hours as needed for wheezing or shortness of breath, Disp: 1 Inhaler, Rfl: 5    azithromycin (ZITHROMAX) 250 mg tablet, Take 2 tablets by mouth on day #1, then 1 tab daily for four more days  , Disp: 6 tablet, Rfl: 0    predniSONE 20 mg tablet, Take 2 tablets (40 mg total) by mouth daily for 5 days, Disp: 10 tablet, Rfl: 0    Allergies   Allergen Reactions    Azithromycin Other (See Comments)     other    Codeine Hives    Keflex [Cephalexin] Other (See Comments)     Other      Promethazine Other (See Comments)     Other      Sulfamethoxazole-Trimethoprim Hives     Social History     Tobacco Use    Smoking status: Never Smoker    Smokeless tobacco: Never Used   Substance Use Topics    Alcohol use: Yes     Comment: social    Drug use: Never       Review of Systems   Constitutional: Positive for fever  Negative for activity change  HENT: Positive for congestion  Respiratory: Positive for cough and wheezing  Objective:      /86   Pulse (!) 107   Temp 99 4 °F (37 4 °C)   Resp 16   Ht 5' 7 95" (1 726 m)   Wt 98 5 kg (217 lb 3 2 oz)   SpO2 97%   BMI 33 07 kg/m²          Physical Exam   Constitutional: She is oriented to person, place, and time  She appears well-developed and well-nourished  No distress  HENT:   Head: Normocephalic and atraumatic  Eyes: Conjunctivae are normal    Neck: Normal range of motion  Neck supple  No thyromegaly present  Cardiovascular: Normal rate, regular rhythm and normal heart sounds  Exam reveals no gallop and no friction rub  No murmur heard  Pulmonary/Chest: Effort normal and breath sounds normal  No stridor  No respiratory distress  She has no wheezes  She has no rales  Lymphadenopathy:     She has no cervical adenopathy  Neurological: She is alert and oriented to person, place, and time  Skin: She is not diaphoretic  Psychiatric: She has a normal mood and affect  Her behavior is normal  Judgment and thought content normal    Nursing note and vitals reviewed

## 2019-08-07 ENCOUNTER — TELEPHONE (OUTPATIENT)
Dept: FAMILY MEDICINE CLINIC | Facility: CLINIC | Age: 39
End: 2019-08-07

## 2019-08-07 NOTE — TELEPHONE ENCOUNTER
Zeyad Gomez wants to come in to discuss weight-loss options  Your first available was 08/30/19, however, she is off work on 08/19/19  Anytime that day is fine  She is a [employer] employee  Is there any chance you can fit her in that day? Over[employer] or use a [employer]? Thank you!

## 2019-08-07 NOTE — TELEPHONE ENCOUNTER
She is cimorelli patient   Does she not want to see him? I can get her in for one of the lunch time appt?

## 2019-08-19 ENCOUNTER — OFFICE VISIT (OUTPATIENT)
Dept: FAMILY MEDICINE CLINIC | Facility: CLINIC | Age: 39
End: 2019-08-19
Payer: COMMERCIAL

## 2019-08-19 VITALS
HEIGHT: 68 IN | OXYGEN SATURATION: 98 % | HEART RATE: 98 BPM | RESPIRATION RATE: 12 BRPM | WEIGHT: 221.6 LBS | SYSTOLIC BLOOD PRESSURE: 126 MMHG | BODY MASS INDEX: 33.59 KG/M2 | DIASTOLIC BLOOD PRESSURE: 78 MMHG

## 2019-08-19 DIAGNOSIS — E66.9 OBESITY (BMI 30-39.9): Primary | ICD-10-CM

## 2019-08-19 PROCEDURE — 1036F TOBACCO NON-USER: CPT | Performed by: FAMILY MEDICINE

## 2019-08-19 PROCEDURE — 3008F BODY MASS INDEX DOCD: CPT | Performed by: FAMILY MEDICINE

## 2019-08-19 PROCEDURE — 99214 OFFICE O/P EST MOD 30 MIN: CPT | Performed by: FAMILY MEDICINE

## 2019-08-19 NOTE — PROGRESS NOTES
Assessment/Plan:    No problem-specific Assessment & Plan notes found for this encounter  Diagnoses and all orders for this visit:    Obesity (BMI 30-39  9)  Comments:  carb counting   calories counting to [id number] rosy/day   to cut back on all processed food and sugar  exercise 2-3 times a week   Orders:  -     liraglutide (SAXENDA) injection; 0 6 mg daily and go up 0 6 mg every week until 3 mg  -     Insulin Pen Needle (NOVOFINE) 32G X 6 MM MISC; by Does not apply route daily      spent 25 minutes with the patient and more than 50 % was spent counseling     BMI Counseling: Body mass index is 33 74 kg/m²  Discussed the patient's BMI with her  The BMI is above average  BMI counseling and education was provided to the patient  Nutrition recommendations include decreasing overall calorie intake, reducing fast food intake, moderation in carbohydrate intake and reducing intake of saturated fat and trans fat  Exercise recommendations include moderate aerobic physical activity for 150 minutes/week  Subjective:      Patient ID: Tree Bryant is a 41 y o  female  HPI  Here to discuss weight   She struggled with weight all her life   Ups and down   Lost 40 pounds in the past but she gained it back due to lack of self control at times  She just joined weight watcher recently and wants to get back on track  Phentermine gave her side effects      The following portions of the patient's history were reviewed and updated as appropriate: allergies, current medications, past family history, past medical history, past social history, past surgical history and problem list     Review of Systems   Constitutional: Negative  HENT: Negative  Eyes: Negative  Respiratory: Negative  Cardiovascular: Negative  Genitourinary: Negative  Musculoskeletal: Negative  Neurological: Negative  Hematological: Negative            Objective:      /78 (BP Location: Right arm, Patient Position: Sitting, Cuff Size: Standard)   Pulse 98   Resp 12   Ht 5' 7 95" (1 726 m)   Wt 101 kg (221 lb 9 6 oz)   SpO2 98%   BMI 33 74 kg/m²          Physical Exam   Constitutional: She is oriented to person, place, and time  She appears well-developed and well-nourished  HENT:   Head: Normocephalic  Eyes: Left eye exhibits no discharge  No scleral icterus  Cardiovascular: Normal rate and regular rhythm  Pulmonary/Chest: Effort normal and breath sounds normal    Abdominal: Soft  Bowel sounds are normal    Neurological: She is alert and oriented to person, place, and time  Psychiatric: She has a normal mood and affect   Her behavior is normal

## 2019-10-02 DIAGNOSIS — E66.9 OBESITY (BMI 30-39.9): ICD-10-CM

## 2019-10-02 DIAGNOSIS — Z78.9 USES BIRTH CONTROL: Primary | ICD-10-CM

## 2019-10-03 RX ORDER — ETONOGESTREL AND ETHINYL ESTRADIOL 11.7; 2.7 MG/1; MG/1
INSERT, EXTENDED RELEASE VAGINAL
Qty: 3 EACH | Refills: 3 | Status: SHIPPED | OUTPATIENT
Start: 2019-10-03 | End: 2019-10-10 | Stop reason: ALTCHOICE

## 2019-10-09 ENCOUNTER — TELEPHONE (OUTPATIENT)
Dept: FAMILY MEDICINE CLINIC | Facility: CLINIC | Age: 39
End: 2019-10-09

## 2019-10-09 ENCOUNTER — AMB VIDEO VISIT (OUTPATIENT)
Dept: OTHER | Facility: HOSPITAL | Age: 39
End: 2019-10-09
Payer: COMMERCIAL

## 2019-10-09 PROCEDURE — 99201 PR OFFICE OUTPATIENT NEW 10 MINUTES: CPT | Performed by: FAMILY MEDICINE

## 2019-10-09 NOTE — CARE ANYWHERE EVISITS
Visit Summary for LIZET Zheng - Gender: Female - Date of Birth: [id number]  Date: [id number] - Duration: 12 minutes  Patient: LIZET Zheng  Provider: Emily Rosario    Patient Contact Information  Address  [address] [place] [address]; [place] Pugh  [phone]    Visit Topics  Hives last night GI issues last night worsening this morning  [Added By: Self - 2019-10-09]    Sick Slip  Reason [ILLNESS]  Remarks [Patient was seen in my clinic today and has been unable to work today         due to illness  I anticipate a return to work date of 10/14/19 or sooner if improved before then  ~Rashawn Valiente MDFor further questions call:   [phone]]  Conversation Transcripts  [0A][0A] [Notification] Fabien Franz, [employer] Staff, will help you prepare for your visit  She is assisting Emily Rosario, Family Physician [0A][Estrella Ward Sis there, and thank you for connecting  While you are waiting for the doctor, are there any   questions I can answer? If you have questions about billing, insurance or technical issues, please contact [facility] service   [place], thank you for your patience  The provider will be with you as soon as possible [0A][Notification] Lior De Paz has left the room  [0A][Notification] Fabien Franz, [employer] Staff, will help you prepare for your visit  She is assisting Family Phil Remy Began Let me check if there is a currently available provider who can see you   more quickly  In that event, would you like to be transferred? [0A][Notification] Fabien Franz has left the room  [0A][Notification] You are connected with Emily Rosario, Family Physician [0A][Notification] Sara Gay is located in   [place]  [0A][Notification] Sara Gay has shared health history  Mac Han  [0A][Notification] Emily Rosario has issued a sick slip  [0A][Notification] Emily Rosario has added a diagnosis/procedure code (see the "Visit Notes" tab)  [0A][Notification]   Yakov Gomez Alexander has added a diagnosis/procedure code (see the "Visit Notes" tab)  [0A][Notification] Angela Cardona has added a diagnosis/procedure code (see the "Visit Notes" tab)  [0A]    Diagnosis  Diarrhea, unspecified  Urticaria, unspecified    Procedures  Value: [id number] Code: CPT-4 ONLINE E/M BY PHYS/QHP    Medications Prescribed    No prescriptions ordered    Provider Notes  [0A][0A] Mode of Communication: Video[0A]The caller confirms they are the patient (or parent/guardian of the patient) on the registration, and that they are calling from [place][0A]HPI:  Onset last night of hives (raised, red, itchy rash)to both thighs, left   axilla, right lateral torso then diarrhea  No fever  Rash resolved with benedry and have not returned  This morning diarrhea every 20 mins since  No nausea or vomiting  Stomach cramps, no pain  No blood in stool  Drinking water to hydrate  A gal from   work had diarrhea and was out for 2 days and others have mentioned a stomach bug going around the community  [0A]PMH: Obesity[0A]Meds: Nuvaring started 3 days ago  Succinda inj almost 2 months  [0A]Allergies: codeine (hives), bactrim, keflex and   phenergan (GI issues)[0A]PSH: [0A]On video exam the patient appears in no distress  nontender abdomen  [0A]Impression:  Veria May, likely viral though cannot rule out drug effect/interaction between her two meds  HIves also could suggest a medication   reaction, though unclear  [0A]Plan: Fluids and electrolyte replacement discussed  Recommend pedialyte now to replace electrolytes  [0A]To urgent care or ER if abdominal pain, particularly localized to one area, blood in stool, any worsening sxs or   inability to hold down fluids  [0A]Discussed the differential diagnosis, risks/benefits for treatment options and when to seek in person care  All questions were addressed  Patient voiced understanding and agreement with plan    [0A]=================================[0A]1    If you received a prescription at this visit and you have a question or problem please call [phone] for prescription assistance[0A]2  Please print a copy of this note and send it to your regular doctor,   or take it to your next visit so it may be included in your medical record[0A]3    Please see your primary care provider on an annual basis or more frequently if directed[0A][0A]    Electronically signed by: Rashawn Ward(NPI [id number])

## 2019-10-10 ENCOUNTER — OFFICE VISIT (OUTPATIENT)
Dept: FAMILY MEDICINE CLINIC | Facility: CLINIC | Age: 39
End: 2019-10-10
Payer: COMMERCIAL

## 2019-10-10 VITALS
TEMPERATURE: 98.8 F | OXYGEN SATURATION: 98 % | WEIGHT: 211.6 LBS | SYSTOLIC BLOOD PRESSURE: 120 MMHG | BODY MASS INDEX: 33.21 KG/M2 | DIASTOLIC BLOOD PRESSURE: 78 MMHG | HEIGHT: 67 IN | RESPIRATION RATE: 16 BRPM | HEART RATE: 104 BPM

## 2019-10-10 DIAGNOSIS — E66.9 OBESITY (BMI 30-39.9): Primary | ICD-10-CM

## 2019-10-10 DIAGNOSIS — L50.9 HIVES: ICD-10-CM

## 2019-10-10 DIAGNOSIS — R19.7 DIARRHEA, UNSPECIFIED TYPE: ICD-10-CM

## 2019-10-10 PROBLEM — E66.3 OVERWEIGHT: Status: RESOLVED | Noted: 2018-02-23 | Resolved: 2019-10-10

## 2019-10-10 PROCEDURE — 99214 OFFICE O/P EST MOD 30 MIN: CPT | Performed by: FAMILY MEDICINE

## 2019-10-10 RX ORDER — PREDNISONE 10 MG/1
40 TABLET ORAL DAILY
Qty: 20 TABLET | Refills: 0 | Status: SHIPPED | OUTPATIENT
Start: 2019-10-10 | End: 2019-10-15

## 2019-10-10 NOTE — PROGRESS NOTES
Assessment/Plan:    No problem-specific Assessment & Plan notes found for this encounter  Diagnoses and all orders for this visit:    Obesity (BMI 30-39  9)  Comments:  doing well  restart Saxenda at 1 2 mg   diet and exercise discussed     Hives  -     predniSONE 10 mg tablet; Take 4 tablets (40 mg total) by mouth daily for 5 days    Diarrhea, unspecified type  Comments:  doing better  continue hydration             Subjective:      Patient ID: Claudia Hernandez is a 42 y o  female  Here for diarrhea, hives and abdominal pain for the last 3 days   Feeling better today   Stopped Nuvaring and diarrhea got better  Still itchy over thigh and elbows   Lost 10 pounds in 2 months on saxenda   Watching her diet     Rash   This is a new problem  The current episode started in the past 7 days  The problem has been gradually improving since onset  The affected locations include the left upper leg, right upper leg, left elbow and right elbow  The rash is characterized by swelling and itchiness  She was exposed to nothing  Associated symptoms include diarrhea  Pertinent negatives include no anorexia, congestion, cough, facial edema, fatigue, fever, joint pain, nail changes, rhinorrhea or shortness of breath  Past treatments include antihistamine  The treatment provided mild relief  There is no history of allergies, asthma, eczema or varicella  The following portions of the patient's history were reviewed and updated as appropriate: allergies, current medications, past family history, past medical history, past social history, past surgical history and problem list     Review of Systems   Constitutional: Negative  Negative for fatigue and fever  HENT: Negative  Negative for congestion and rhinorrhea  Respiratory: Negative  Negative for cough and shortness of breath  Cardiovascular: Negative  Gastrointestinal: Positive for abdominal pain and diarrhea  Negative for anorexia  Endocrine: Negative  Musculoskeletal: Negative for joint pain  Skin: Positive for rash  Negative for nail changes  Neurological: Negative for facial asymmetry and headaches  Hematological: Negative for adenopathy  Does not bruise/bleed easily  Objective:      /78 (BP Location: Left arm, Patient Position: Sitting, Cuff Size: Standard)   Pulse 104   Temp 98 8 °F (37 1 °C) (Tympanic)   Resp 16   Ht 5' 7" (1 702 m)   Wt 96 kg (211 lb 9 6 oz)   SpO2 98%   BMI 33 14 kg/m²          Physical Exam   Constitutional: She is oriented to person, place, and time  She appears well-developed and well-nourished  Eyes: Pupils are equal, round, and reactive to light  EOM are normal    Neck: No tracheal deviation present  No thyromegaly present  Cardiovascular: Normal rate and regular rhythm  Exam reveals no friction rub  No murmur heard  Pulmonary/Chest: Effort normal and breath sounds normal    Abdominal: Soft  Bowel sounds are normal  She exhibits no distension  There is no tenderness  Neurological: She is alert and oriented to person, place, and time  Skin: Rash noted  Over bilateral thighs and elbow   Psychiatric: She has a normal mood and affect   Her behavior is normal

## 2019-10-11 ENCOUNTER — TELEPHONE (OUTPATIENT)
Dept: FAMILY MEDICINE CLINIC | Facility: CLINIC | Age: 39
End: 2019-10-11

## 2019-10-11 NOTE — TELEPHONE ENCOUNTER
PT CALLED SHE HAD BEEN IN THE OTHER DAY TO SEE YOU FOR HIVES AND THE PREDNISONE IS NOT WORKING  SHE SAID THE HIVES ARE BACK   PLS ADVISE

## 2019-10-11 NOTE — TELEPHONE ENCOUNTER
She needs to give more time with the prednisone  Add Claritin daily and pepcid twice a day as well and benadryl at night    Dont start saxenda until all hives are gone

## 2019-10-14 ENCOUNTER — OFFICE VISIT (OUTPATIENT)
Dept: URGENT CARE | Age: 39
End: 2019-10-14
Payer: COMMERCIAL

## 2019-10-14 ENCOUNTER — APPOINTMENT (OUTPATIENT)
Dept: RADIOLOGY | Age: 39
End: 2019-10-14
Attending: PHYSICIAN ASSISTANT
Payer: COMMERCIAL

## 2019-10-14 VITALS
OXYGEN SATURATION: 99 % | HEART RATE: 100 BPM | TEMPERATURE: 98.2 F | RESPIRATION RATE: 16 BRPM | HEIGHT: 67 IN | SYSTOLIC BLOOD PRESSURE: 123 MMHG | WEIGHT: 215 LBS | BODY MASS INDEX: 33.74 KG/M2 | DIASTOLIC BLOOD PRESSURE: 77 MMHG

## 2019-10-14 DIAGNOSIS — E66.9 OBESITY (BMI 30-39.9): ICD-10-CM

## 2019-10-14 DIAGNOSIS — J06.9 UPPER RESPIRATORY TRACT INFECTION, UNSPECIFIED TYPE: Primary | ICD-10-CM

## 2019-10-14 DIAGNOSIS — R06.02 SHORTNESS OF BREATH: ICD-10-CM

## 2019-10-14 PROCEDURE — 71046 X-RAY EXAM CHEST 2 VIEWS: CPT

## 2019-10-14 PROCEDURE — 99213 OFFICE O/P EST LOW 20 MIN: CPT | Performed by: PHYSICIAN ASSISTANT

## 2019-10-14 PROCEDURE — S9088 SERVICES PROVIDED IN URGENT: HCPCS | Performed by: PHYSICIAN ASSISTANT

## 2019-10-14 RX ORDER — DOXYCYCLINE HYCLATE 100 MG/1
100 CAPSULE ORAL EVERY 12 HOURS SCHEDULED
Qty: 20 CAPSULE | Refills: 0 | Status: SHIPPED | OUTPATIENT
Start: 2019-10-14 | End: 2019-10-24

## 2019-10-14 NOTE — LETTER
October 14, 2019     Patient: Lesley Liu   YOB: 1980   Date of Visit: 10/14/2019       To Whom It May Concern:    Please excuse Lesley Liu from work 10/15/2019 due to illness           Sincerely,        Dre Cabello PA-C    CC: No Recipients

## 2019-10-14 NOTE — PROGRESS NOTES
[facility]        NAME: Reinaldo Ridley is a 42 y o  female  : 1980    MRN: [id number]  DATE: 2019  TIME: 5:44 PM    Assessment and Plan   Upper respiratory tract infection, unspecified type [J06 9]  1  Upper respiratory tract infection, unspecified type  XR chest pa & lateral    doxycycline hyclate (VIBRAMYCIN) 100 mg capsule         Patient Instructions       Follow up with PCP in 3-5 days  Proceed to  ER if symptoms worsen  Chief Complaint     Chief Complaint   Patient presents with    Shortness of Breath     Pt began having hives and diarrhea on Tuesday  She went to her PCP and was given prednisone  She then began having SOB, chest tightness and back pain on Friday  Her PCP recommended that she come here for a chest xray  History of Present Illness       Patient for evaluation of feelings of shortness of breath and congestion and postnasal drip which started last week  Patient initially started with highs and diarrhea early in the week  She did see her primary care physician was placed on prednisone  She took it for 2 days and had to stop it due to having a reaction  On Friday and over the weekend she was feeling worse with fevers at the high as 101 degrees  Patient states that the high as have improved  She has not had any more diarrhea since the weekend  Review of Systems   Review of Systems   Constitutional: Positive for chills and fever  Negative for activity change and appetite change  HENT: Positive for congestion, postnasal drip and rhinorrhea  Negative for ear pain, sinus pressure, sinus pain, sore throat, trouble swallowing and voice change  Eyes: Negative  Respiratory: Positive for cough (Dry) and shortness of breath  Negative for wheezing  Cardiovascular: Negative  Gastrointestinal: Positive for diarrhea and nausea  Negative for abdominal pain and vomiting  Skin: Negative for rash           Current Medications       Current Outpatient Medications:     doxycycline hyclate (VIBRAMYCIN) 100 mg capsule, Take 1 capsule (100 mg total) by mouth every 12 (twelve) hours for 10 days, Disp: 20 capsule, Rfl: 0    Insulin Pen Needle (NOVOFINE) 32G X 6 MM MISC, by Does not apply route daily (Patient not taking: Reported on 10/14/2019), Disp: 100 each, Rfl: 0    liraglutide (SAXENDA) injection, 0 6 mg daily and go up 0 6 mg every week until 3 mg (Patient not taking: Reported on 10/14/2019), Disp: 5 pen, Rfl: 0    predniSONE 10 mg tablet, Take 4 tablets (40 mg total) by mouth daily for 5 days (Patient not taking: Reported on 10/14/2019), Disp: 20 tablet, Rfl: 0    Current Allergies     Allergies as of 10/14/2019 - Reviewed 10/14/2019   Allergen Reaction Noted    Azithromycin Other (See Comments) 02/23/2018    Codeine Hives 02/23/2018    Keflex [cephalexin] Other (See Comments) 02/23/2018    Promethazine Other (See Comments) 02/23/2018    Sulfamethoxazole-trimethoprim Hives 02/23/2018            The following portions of the patient's history were reviewed and updated as appropriate: allergies, current medications, past family history, past medical history, past social history, past surgical history and problem list      Past Medical History:   Diagnosis Date    Overweight 2/23/2018    SOB (shortness of breath)        History reviewed  No pertinent surgical history  Family History   Problem Relation Age of Onset    Diabetes Mother     Hypertension Mother     No Known Problems Father     No Known Problems Sister     No Known Problems Brother     No Known Problems Son     No Known Problems Maternal Grandmother     No Known Problems Maternal Grandfather     Heart disease Paternal Grandmother     Heart disease Paternal Grandfather     No Known Problems Brother          Medications have been verified          Objective   /77 (BP Location: Left arm, Patient Position: Sitting)   Pulse 100   Temp 98 2 °F (36 8 °C) (Temporal) Resp 16    5' 7" (1 702 m)   Wt 97 5 kg (215 lb)   LMP 09/30/2019   SpO2 99%   BMI 33 67 kg/m²        Physical Exam     Physical Exam   Constitutional: She is oriented to person, place, and time  She appears well-developed and well-nourished  No distress  HENT:   Head: Normocephalic and atraumatic  Right Ear: External ear normal    Left Ear: External ear normal    Mouth/Throat: No oropharyngeal exudate or posterior oropharyngeal edema  Bilateral nasal congestion and mild erythema  No discharge  Bilateral tonsillar erythema with no soft tissue swelling  No exudate  Eyes: Pupils are equal, round, and reactive to light  Conjunctivae and EOM are normal  Right eye exhibits no discharge  Left eye exhibits no discharge  Cardiovascular: Normal rate, regular rhythm and normal heart sounds  No murmur heard  Pulmonary/Chest: Effort normal and breath sounds normal  No stridor  No respiratory distress  She has no wheezes  She has no rales  Abdominal: Soft  Bowel sounds are normal  She exhibits no distension and no mass  There is no tenderness  There is no rebound and no guarding  Lymphadenopathy:     She has no cervical adenopathy  Neurological: She is alert and oriented to person, place, and time  Skin: Skin is warm and dry  No rash noted  She is not diaphoretic  Psychiatric: She has a normal mood and affect  Her behavior is normal  Judgment and thought content normal    Nursing note and vitals reviewed

## 2019-10-15 ENCOUNTER — TELEPHONE (OUTPATIENT)
Dept: FAMILY MEDICINE CLINIC | Facility: CLINIC | Age: 39
End: 2019-10-15

## 2019-10-22 ENCOUNTER — TELEPHONE (OUTPATIENT)
Dept: FAMILY MEDICINE CLINIC | Facility: CLINIC | Age: 39
End: 2019-10-22

## 2019-10-22 ENCOUNTER — OFFICE VISIT (OUTPATIENT)
Dept: FAMILY MEDICINE CLINIC | Facility: CLINIC | Age: 39
End: 2019-10-22
Payer: COMMERCIAL

## 2019-10-22 VITALS
HEART RATE: 92 BPM | WEIGHT: 215.4 LBS | HEIGHT: 67 IN | SYSTOLIC BLOOD PRESSURE: 122 MMHG | DIASTOLIC BLOOD PRESSURE: 80 MMHG | OXYGEN SATURATION: 97 % | RESPIRATION RATE: 16 BRPM | BODY MASS INDEX: 33.81 KG/M2 | TEMPERATURE: 97.3 F

## 2019-10-22 DIAGNOSIS — F41.9 ANXIETY: Primary | ICD-10-CM

## 2019-10-22 PROCEDURE — 99214 OFFICE O/P EST MOD 30 MIN: CPT | Performed by: FAMILY MEDICINE

## 2019-10-22 PROCEDURE — 3008F BODY MASS INDEX DOCD: CPT | Performed by: FAMILY MEDICINE

## 2019-10-22 RX ORDER — HYDROXYZINE HYDROCHLORIDE 25 MG/1
25 TABLET, FILM COATED ORAL EVERY 6 HOURS PRN
Qty: 30 TABLET | Refills: 0 | Status: SHIPPED | OUTPATIENT
Start: 2019-10-22 | End: 2020-10-05 | Stop reason: ALTCHOICE

## 2019-10-22 RX ORDER — HYDROXYZINE HYDROCHLORIDE 25 MG/1
25 TABLET, FILM COATED ORAL EVERY 6 HOURS PRN
Qty: 30 TABLET | Refills: 0 | Status: SHIPPED | OUTPATIENT
Start: 2019-10-22 | End: 2019-10-22 | Stop reason: SDUPTHER

## 2019-10-22 NOTE — PROGRESS NOTES
Assessment/Plan:    No problem-specific Assessment & Plan notes found for this encounter  Diagnoses and all orders for this visit:    Anxiety  Comments:  work related  hydroxyzine PRN   she might look into a different job option as she doesnt want to be at her current job anymore as it gives her hives everytime  Orders:  -     Discontinue: hydrOXYzine HCL (ATARAX) 25 mg tablet; Take 1 tablet (25 mg total) by mouth every 6 (six) hours as needed for itching  -     hydrOXYzine HCL (ATARAX) 25 mg tablet; Take 1 tablet (25 mg total) by mouth every 6 (six) hours as needed for itching      spent 25 minutes with the patient and more than 50% was spent counseling     Subjective:      Patient ID: Callum Blackman is a 42 y o  female  Here for possible anxiety attack at work   [employer] like not being treated equally at work   Hives at work yesterday and none at home   Have anxiety going to work       Anxiety   Presents for initial visit  Symptoms include excessive worry, irritability, nervous/anxious behavior and panic  Patient reports no chest pain, compulsions, confusion, decreased concentration, depressed mood, dizziness, dry mouth, feeling of choking, hyperventilation, impotence, insomnia, malaise, muscle tension, nausea, obsessions, palpitations, restlessness, shortness of breath or suicidal ideas  Symptoms occur occasionally  The severity of symptoms is mild  The quality of sleep is good  There are no known risk factors  There is no history of anemia, anxiety/panic attacks, arrhythmia, asthma, bipolar disorder, CAD, CHF, chronic lung disease, depression, fibromyalgia, hyperthyroidism or suicide attempts  Past treatments include nothing  Compliance with prior treatments has been good           The following portions of the patient's history were reviewed and updated as appropriate: allergies, current medications, past family history, past medical history, past social history, past surgical history and problem list     Review of Systems   Constitutional: Positive for irritability  Respiratory: Negative for shortness of breath  Cardiovascular: Negative for chest pain and palpitations  Gastrointestinal: Negative for nausea  Genitourinary: Negative for impotence  Neurological: Negative for dizziness  Psychiatric/Behavioral: Negative for confusion, decreased concentration and suicidal ideas  The patient is nervous/anxious  The patient does not have insomnia  Objective:      /80 (BP Location: Left arm, Patient Position: Sitting, Cuff Size: Standard)   Pulse 92   Temp (!) 97 3 °F (36 3 °C) (Tympanic)   Resp 16   Ht 5' 7" (1 702 m)   Wt 97 7 kg (215 lb 6 4 oz)   LMP 09/30/2019   SpO2 97%   BMI 33 74 kg/m²          Physical Exam   Constitutional: She is oriented to person, place, and time  She appears well-developed and well-nourished  Eyes: Pupils are equal, round, and reactive to light  EOM are normal    Neck: No tracheal deviation present  No thyromegaly present  Cardiovascular: Normal rate and regular rhythm  Pulmonary/Chest: Effort normal and breath sounds normal    Neurological: She is alert and oriented to person, place, and time  No cranial nerve deficit  Coordination normal    Psychiatric: She has a normal mood and affect   Her behavior is normal

## 2019-10-22 NOTE — TELEPHONE ENCOUNTER
Pt called her son is coming in at 9:15 to see dr Pipe Lam and she is still sick she said  She got hives again and congested and just dont feel good   Can you see her after her son pls    Thank you

## 2019-10-26 DIAGNOSIS — E66.9 OBESITY (BMI 30-39.9): ICD-10-CM

## 2019-10-28 DIAGNOSIS — E66.9 OBESITY (BMI 30-39.9): ICD-10-CM

## 2020-02-15 ENCOUNTER — OFFICE VISIT (OUTPATIENT)
Dept: URGENT CARE | Age: 40
End: 2020-02-15
Payer: COMMERCIAL

## 2020-02-15 VITALS
HEIGHT: 68 IN | WEIGHT: 208 LBS | BODY MASS INDEX: 31.52 KG/M2 | TEMPERATURE: 98 F | OXYGEN SATURATION: 98 % | SYSTOLIC BLOOD PRESSURE: 134 MMHG | RESPIRATION RATE: 20 BRPM | DIASTOLIC BLOOD PRESSURE: 81 MMHG | HEART RATE: 107 BPM

## 2020-02-15 DIAGNOSIS — J06.9 ACUTE URI: Primary | ICD-10-CM

## 2020-02-15 PROCEDURE — 99213 OFFICE O/P EST LOW 20 MIN: CPT | Performed by: PHYSICIAN ASSISTANT

## 2020-02-15 RX ORDER — DOXYCYCLINE 100 MG/1
100 TABLET ORAL 2 TIMES DAILY
Qty: 14 TABLET | Refills: 0 | Status: SHIPPED | OUTPATIENT
Start: 2020-02-15 | End: 2020-02-22

## 2020-02-15 RX ORDER — BENZONATATE 200 MG/1
200 CAPSULE ORAL 3 TIMES DAILY PRN
Qty: 20 CAPSULE | Refills: 0 | Status: SHIPPED | OUTPATIENT
Start: 2020-02-15 | End: 2020-02-17 | Stop reason: ALTCHOICE

## 2020-02-15 NOTE — PROGRESS NOTES
[id number] [facility]        NAME: Evon Quiroga is a 42 y o  female  : 1980    MRN: [id number]  DATE: February 15, 2020  TIME: 9:08 AM    Assessment and Plan   Acute URI [J06 9]  1  Acute URI  doxycycline (ADOXA) 100 MG tablet    benzonatate (TESSALON) 200 MG capsule         Patient Instructions     Start antibiotics as directed  -Tessalon Perles as needed for cough  -over-the-counter medications as needed for symptoms  -drink plenty of fluids  -probiotics while on antibiotics  Follow up with PCP in 3-5 days  Proceed to  ER if symptoms worsen  Chief Complaint     Chief Complaint   Patient presents with    Cough     sick for past 2 weeks currently  cough, congestion has been getting worse over past day  cough is non-productive  son recently sick and dx'd with cold  No fevers noted at home, highest temp of 100  7  has tried multiple OTC meds with no symptom relief  History of Present Illness       Patient has had a cough for the past 2 weeks  She states her congestion is getting worse  She does have some chest tightness and hurts when she coughs  She did have some sinus pain and pressure the other day but that has resolved  Her throat only hurts from coughing  She had a fever as high as 100 the other day but denies I fevers or chills  She has tried over-the-counter medications without relief      Review of Systems   Review of Systems   Constitutional: Positive for chills, fatigue and fever  HENT: Positive for congestion, sinus pressure, sinus pain and sore throat  Respiratory: Positive for cough  Negative for choking, shortness of breath, wheezing and stridor  Cardiovascular: Negative  Gastrointestinal: Negative  Musculoskeletal: Negative  Neurological: Negative  Psychiatric/Behavioral: Negative            Current Medications       Current Outpatient Medications:     benzonatate (TESSALON) 200 MG capsule, Take 1 capsule (200 mg total) by mouth 3 (three) times a day as needed for cough, Disp: 20 capsule, Rfl: 0    doxycycline (ADOXA) 100 MG tablet, Take 1 tablet (100 mg total) by mouth 2 (two) times a day for 7 days, Disp: 14 tablet, Rfl: 0    hydrOXYzine HCL (ATARAX) 25 mg tablet, Take 1 tablet (25 mg total) by mouth every 6 (six) hours as needed for itching (Patient not taking: Reported on 2/15/2020), Disp: 30 tablet, Rfl: 0    Insulin Pen Needle (NOVOFINE) 32G X 6 MM MISC, by Does not apply route daily (Patient not taking: Reported on 10/14/2019), Disp: 100 each, Rfl: 0    liraglutide (SAXENDA) injection, 0 6 mg daily and go up 0 6 mg every week until 3 mg (Patient not taking: Reported on 10/14/2019), Disp: 5 pen, Rfl: 0    Current Allergies     Allergies as of 02/15/2020 - Reviewed 02/15/2020   Allergen Reaction Noted    Azithromycin Other (See Comments) 02/23/2018    Codeine Hives 02/23/2018    Keflex [cephalexin] Other (See Comments) 02/23/2018    Promethazine Other (See Comments) 02/23/2018    Sulfamethoxazole-trimethoprim Hives 02/23/2018            The following portions of the patient's history were reviewed and updated as appropriate: allergies, current medications, past family history, past medical history, past social history, past surgical history and problem list      Past Medical History:   Diagnosis Date    Overweight 2/23/2018    SOB (shortness of breath)        History reviewed  No pertinent surgical history  Family History   Problem Relation Age of Onset    Diabetes Mother     Hypertension Mother     No Known Problems Father     No Known Problems Sister     No Known Problems Brother     No Known Problems Son     No Known Problems Maternal Grandmother     No Known Problems Maternal Grandfather     Heart disease Paternal Grandmother     Heart disease Paternal Grandfather     No Known Problems Brother          Medications have been verified          Objective   /81 (BP Location: Left arm, Patient Position: Sitting, Cuff Size: Standard)   Pulse (!) 107   Temp 98 °F (36 7 °C) (Temporal)   Resp 20   Ht 5' 7 5" (1 715 m)   Wt 94 3 kg (208 lb)   LMP 01/15/2020 (Within Days)   SpO2 98%   BMI 32 10 kg/m²        Physical Exam     Physical Exam   Constitutional: She is oriented to person, place, and time  She appears well-developed and well-nourished  No distress  HENT:   Head: Normocephalic and atraumatic  Right Ear: External ear normal    Left Ear: External ear normal    Nose: Nose normal    Mouth/Throat: Oropharynx is clear and moist  No oropharyngeal exudate  Cardiovascular: Normal rate, regular rhythm and normal heart sounds  Pulmonary/Chest: Effort normal and breath sounds normal    Neurological: She is alert and oriented to person, place, and time  Skin: Skin is warm and dry  She is not diaphoretic  Psychiatric: She has a normal mood and affect  Her behavior is normal    Nursing note and vitals reviewed

## 2020-02-15 NOTE — PATIENT INSTRUCTIONS
Start antibiotics as directed  -Tessalon Perles as needed for cough  -over-the-counter medications as needed for symptoms  -drink plenty of fluids  -probiotics while on antibiotics  -follow-up with primary care doctor in 3-5 days  -ER symptoms worsen

## 2020-02-17 ENCOUNTER — OFFICE VISIT (OUTPATIENT)
Dept: FAMILY MEDICINE CLINIC | Facility: CLINIC | Age: 40
End: 2020-02-17
Payer: COMMERCIAL

## 2020-02-17 VITALS
RESPIRATION RATE: 18 BRPM | TEMPERATURE: 99.8 F | SYSTOLIC BLOOD PRESSURE: 110 MMHG | OXYGEN SATURATION: 98 % | DIASTOLIC BLOOD PRESSURE: 86 MMHG | HEART RATE: 113 BPM | WEIGHT: 214 LBS | BODY MASS INDEX: 33.02 KG/M2

## 2020-02-17 DIAGNOSIS — J20.9 ACUTE BRONCHITIS, UNSPECIFIED ORGANISM: Primary | ICD-10-CM

## 2020-02-17 PROBLEM — F41.9 ANXIETY: Status: ACTIVE | Noted: 2020-02-17

## 2020-02-17 PROCEDURE — 99213 OFFICE O/P EST LOW 20 MIN: CPT | Performed by: FAMILY MEDICINE

## 2020-02-17 PROCEDURE — 1036F TOBACCO NON-USER: CPT | Performed by: FAMILY MEDICINE

## 2020-02-17 RX ORDER — PREDNISONE 10 MG/1
TABLET ORAL
Qty: 20 TABLET | Refills: 0 | Status: SHIPPED | OUTPATIENT
Start: 2020-02-17 | End: 2020-10-05 | Stop reason: ALTCHOICE

## 2020-02-17 NOTE — PROGRESS NOTES
Assessment/Plan:    No problem-specific Assessment & Plan notes found for this encounter  Diagnoses and all orders for this visit:    Acute bronchitis, unspecified organism  Comments:  patient refused to be swabbed for flu today   added prednisone taper   to take flonase daily   continue doxycycline   f/u as needed   Orders:  -     predniSONE 10 mg tablet; 4 tabs x 2 days, 3 tabs x 2 days, 2 tabs x 2 days, 1 tab x 2 days          Subjective:      Patient ID: Eugenio Espana is a 42 y o  female  Cough for 4 weeks   Went to urgent care yesterday and given doxycycline   Not feeling better  Low grade fever on and off     Cough   This is a new problem  The current episode started 1 to 4 weeks ago  The problem occurs constantly  The cough is non-productive  Associated symptoms include rhinorrhea  Pertinent negatives include no chest pain, chills, ear congestion, ear pain, fever, headaches, heartburn, hemoptysis, myalgias, nasal congestion, postnasal drip, rash, sore throat, shortness of breath, sweats, weight loss or wheezing  The following portions of the patient's history were reviewed and updated as appropriate: allergies, current medications, past family history, past medical history, past social history, past surgical history and problem list     Review of Systems   Constitutional: Negative for chills, fever and weight loss  HENT: Positive for rhinorrhea  Negative for ear pain, postnasal drip and sore throat  Respiratory: Positive for cough  Negative for hemoptysis, shortness of breath and wheezing  Cardiovascular: Negative for chest pain  Gastrointestinal: Negative for heartburn  Musculoskeletal: Negative for myalgias  Skin: Negative for rash  Neurological: Negative for headaches           Objective:      /86 (BP Location: Left arm, Patient Position: Sitting, Cuff Size: Large)   Pulse (!) 113   Temp 99 8 °F (37 7 °C) (Tympanic)   Resp 18   Wt 97 1 kg (214 lb)   SpO2 98%   BMI 33 02 kg/m²          Physical Exam   Constitutional: She appears well-developed and well-nourished  HENT:   Head: Normocephalic  Neck: No thyromegaly present  Cardiovascular: Normal rate and regular rhythm  Pulmonary/Chest: Effort normal and breath sounds normal  No stridor  No respiratory distress  Abdominal: She exhibits no distension  Psychiatric: She has a normal mood and affect   Her behavior is normal

## 2020-03-26 DIAGNOSIS — Z20.828 EXPOSURE TO SARS-ASSOCIATED CORONAVIRUS: Primary | ICD-10-CM

## 2020-03-26 DIAGNOSIS — Z20.828 EXPOSURE TO SARS-ASSOCIATED CORONAVIRUS: ICD-10-CM

## 2020-03-26 PROCEDURE — 87635 SARS-COV-2 COVID-19 AMP PRB: CPT

## 2020-03-26 PROCEDURE — U0002 COVID-19 LAB TEST NON-CDC: HCPCS

## 2020-03-26 PROCEDURE — 99213 OFFICE O/P EST LOW 20 MIN: CPT | Performed by: FAMILY MEDICINE

## 2020-03-26 NOTE — PROGRESS NOTES
COVID-19 Virtual Visit     This virtual check-in was done via telephone  Encounter provider Jonel Hannon DO    Provider located at [place] [address] [address]    Recent Visits  No visits were found meeting these conditions  Showing recent visits within past 7 days and meeting all other requirements     Future Appointments  No visits were found meeting these conditions  Showing future appointments within next 150 days and meeting all other requirements        Patient agrees to participate in a virtual check in via telephone or video visit instead of presenting to the office to address urgent/immediate medical needs  Patient is aware this is a billable service  After connecting through telephone, the patient was identified by name and date of birth  Treasure Austin was informed that this was a telemedicine visit and that the exam was being conducted confidentially over secure lines  My office door was closed  No one else was in the room  Treasure Austin acknowledged consent and understanding of privacy and security of the telemedicine visit  I informed the patient that I have reviewed her record in Epic and presented the opportunity for her to ask any questions regarding the visit today  The patient agreed to participate  Treasure Austin is a 42 y o  female who is concerned about COVID-19  She reports fever and cough  She has not traveled outside the [place]  within the last 14 days    She has had contact with a person who is under investigation for or who is positive for COVID-19 within the last 14 days  She has not been hospitalized recently for fever and/or lower respiratory symptoms  Past Medical History:   Diagnosis Date    Anxiety 2/17/2020    Overweight 2/23/2018    SOB (shortness of breath)        No past surgical history on file      Current Outpatient Medications   Medication Sig Dispense Refill    hydrOXYzine HCL (ATARAX) 25 mg tablet Take 1 tablet (25 mg total) by mouth every 6 (six) hours as needed for itching (Patient not taking: Reported on 2/15/2020) 30 tablet 0    Insulin Pen Needle (NOVOFINE) 32G X 6 MM MISC by Does not apply route daily (Patient not taking: Reported on 10/14/2019) 100 each 0    liraglutide (SAXENDA) injection 0 6 mg daily and go up 0 6 mg every week until 3 mg (Patient not taking: Reported on 10/14/2019) 5 pen 0    predniSONE 10 mg tablet 4 tabs x 2 days, 3 tabs x 2 days, 2 tabs x 2 days, 1 tab x 2 days 20 tablet 0     No current facility-administered medications for this visit  Allergies   Allergen Reactions    Azithromycin Other (See Comments)     other    Codeine Hives    Keflex [Cephalexin] Other (See Comments)     Other      Promethazine Other (See Comments)     Other      Sulfamethoxazole-Trimethoprim Hives       Video Exam    Jerica appears healthy, alert, no distress  Disposition:      I referred Jerica to one of our centralized sites for a COVID-19 swab  I spent 10 minutes with the patient during this virtual check-in visit

## 2020-03-30 ENCOUNTER — TELEPHONE (OUTPATIENT)
Dept: FAMILY MEDICINE CLINIC | Facility: CLINIC | Age: 40
End: 2020-03-30

## 2020-03-30 NOTE — TELEPHONE ENCOUNTER
Ingris Sharma was tested on 03/26/20 & had a note from Dr Noel Serrano her from work until 03/30/2020  Since the test results may take up to 7 days, can she have a letter extending his work leave? Thank you!

## 2020-03-31 LAB — SARS-COV-2 RNA SPEC QL NAA+PROBE: NOT DETECTED

## 2020-09-25 DIAGNOSIS — R09.81 NASAL CONGESTION: ICD-10-CM

## 2020-09-25 DIAGNOSIS — R09.81 NASAL CONGESTION: Primary | ICD-10-CM

## 2020-09-25 PROCEDURE — U0003 INFECTIOUS AGENT DETECTION BY NUCLEIC ACID (DNA OR RNA); SEVERE ACUTE RESPIRATORY SYNDROME CORONAVIRUS 2 (SARS-COV-2) (CORONAVIRUS DISEASE [COVID-19]), AMPLIFIED PROBE TECHNIQUE, MAKING USE OF HIGH THROUGHPUT TECHNOLOGIES AS DESCRIBED BY CMS-2020-01-R: HCPCS | Performed by: OPHTHALMOLOGY

## 2020-09-26 LAB — SARS-COV-2 RNA SPEC QL NAA+PROBE: NOT DETECTED

## 2020-10-05 ENCOUNTER — OFFICE VISIT (OUTPATIENT)
Dept: FAMILY MEDICINE CLINIC | Facility: CLINIC | Age: 40
End: 2020-10-05
Payer: COMMERCIAL

## 2020-10-05 VITALS
BODY MASS INDEX: 34.1 KG/M2 | OXYGEN SATURATION: 100 % | TEMPERATURE: 98.3 F | WEIGHT: 225 LBS | HEART RATE: 104 BPM | HEIGHT: 68 IN | RESPIRATION RATE: 16 BRPM | DIASTOLIC BLOOD PRESSURE: 80 MMHG | SYSTOLIC BLOOD PRESSURE: 130 MMHG

## 2020-10-05 DIAGNOSIS — M25.511 ACUTE PAIN OF RIGHT SHOULDER: Primary | ICD-10-CM

## 2020-10-05 DIAGNOSIS — Z11.59 ENCOUNTER FOR SCREENING FOR OTHER VIRAL DISEASES: ICD-10-CM

## 2020-10-05 PROCEDURE — 3725F SCREEN DEPRESSION PERFORMED: CPT | Performed by: FAMILY MEDICINE

## 2020-10-05 PROCEDURE — 99213 OFFICE O/P EST LOW 20 MIN: CPT | Performed by: FAMILY MEDICINE

## 2020-10-05 PROCEDURE — 1036F TOBACCO NON-USER: CPT | Performed by: FAMILY MEDICINE

## 2020-10-05 RX ORDER — PREDNISONE 10 MG/1
TABLET ORAL
Qty: 20 TABLET | Refills: 0 | Status: SHIPPED | OUTPATIENT
Start: 2020-10-05 | End: 2021-01-04

## 2020-11-05 DIAGNOSIS — Z11.59 ENCOUNTER FOR SCREENING FOR OTHER VIRAL DISEASES: ICD-10-CM

## 2020-11-05 PROCEDURE — U0003 INFECTIOUS AGENT DETECTION BY NUCLEIC ACID (DNA OR RNA); SEVERE ACUTE RESPIRATORY SYNDROME CORONAVIRUS 2 (SARS-COV-2) (CORONAVIRUS DISEASE [COVID-19]), AMPLIFIED PROBE TECHNIQUE, MAKING USE OF HIGH THROUGHPUT TECHNOLOGIES AS DESCRIBED BY CMS-2020-01-R: HCPCS | Performed by: FAMILY MEDICINE

## 2020-11-06 LAB — SARS-COV-2 RNA SPEC QL NAA+PROBE: NOT DETECTED

## 2020-11-20 DIAGNOSIS — R09.81 NASAL CONGESTION: Primary | ICD-10-CM

## 2020-11-23 DIAGNOSIS — R09.81 NASAL CONGESTION: ICD-10-CM

## 2020-11-23 PROCEDURE — U0003 INFECTIOUS AGENT DETECTION BY NUCLEIC ACID (DNA OR RNA); SEVERE ACUTE RESPIRATORY SYNDROME CORONAVIRUS 2 (SARS-COV-2) (CORONAVIRUS DISEASE [COVID-19]), AMPLIFIED PROBE TECHNIQUE, MAKING USE OF HIGH THROUGHPUT TECHNOLOGIES AS DESCRIBED BY CMS-2020-01-R: HCPCS | Performed by: OPHTHALMOLOGY

## 2020-11-24 ENCOUNTER — TELEMEDICINE (OUTPATIENT)
Dept: FAMILY MEDICINE CLINIC | Facility: CLINIC | Age: 40
End: 2020-11-24
Payer: COMMERCIAL

## 2020-11-24 VITALS — TEMPERATURE: 99.9 F

## 2020-11-24 DIAGNOSIS — Z20.822 EXPOSURE TO COVID-19 VIRUS: Primary | ICD-10-CM

## 2020-11-24 LAB — SARS-COV-2 RNA SPEC QL NAA+PROBE: NOT DETECTED

## 2020-11-24 PROCEDURE — 1036F TOBACCO NON-USER: CPT | Performed by: FAMILY MEDICINE

## 2020-11-24 PROCEDURE — 99214 OFFICE O/P EST MOD 30 MIN: CPT | Performed by: FAMILY MEDICINE

## 2020-11-28 DIAGNOSIS — Z20.822 EXPOSURE TO COVID-19 VIRUS: ICD-10-CM

## 2020-11-28 PROCEDURE — U0003 INFECTIOUS AGENT DETECTION BY NUCLEIC ACID (DNA OR RNA); SEVERE ACUTE RESPIRATORY SYNDROME CORONAVIRUS 2 (SARS-COV-2) (CORONAVIRUS DISEASE [COVID-19]), AMPLIFIED PROBE TECHNIQUE, MAKING USE OF HIGH THROUGHPUT TECHNOLOGIES AS DESCRIBED BY CMS-2020-01-R: HCPCS | Performed by: FAMILY MEDICINE

## 2020-11-29 LAB — SARS-COV-2 RNA SPEC QL NAA+PROBE: NOT DETECTED

## 2021-01-02 ENCOUNTER — AMB VIDEO VISIT (OUTPATIENT)
Dept: OTHER | Facility: HOSPITAL | Age: 41
End: 2021-01-02

## 2021-01-02 NOTE — CARE ANYWHERE EVISITS
Visit Summary for JERICA Zheng - Gender: Female - Date of Birth: [id number]  Date: [id number] - Duration: 9 minutes  Patient: JERICA Zheng  Provider: Yari Zheng    Patient Contact Information  Address  [address]  [place]  [phone]    Visit Topics  Sore throat  [Added By: Self - 2021-01-02]    Sick Slip  Reason [ILLNESS]  Remarks [To whom it may concern,Jerica Ames was seen today because of an illness  He/She may not return to work until he/she is fever free for 72 hours, symptoms have significantly improved for 3 days,  and at least 10 days have   passed since his/her initial symptomsSincerely,Angeles Qu[facility], [place] [address], [phone]]  Triage Questions   What is your current physical address in the event of a medical emergency? Answer []  Are you allergic to any medications? Answer []  Are you now or could you be pregnant? Answer []  Do you have any immune system compromise or chronic lung   disease? Answer []  Do you have any vulnerable family members in the home (infant, pregnant, cancer, elderly)? Answer []     Conversation Transcripts  [0A][0A] [Notification] Practice Staff, Global Staff, will help you prepare for your visit  She is assisting Yari Zheng Family Physician [0A][Practice Staff] Hello, and thank you for connecting  While you are waiting for the doctor, are there any   questions I can answer for you about our service? Please contact customer service if you have questions about billing, insurance, or technical issues  Visits work best with a stable WiFi connection, so please make sure you are connected before we   begin [0A][Notification] Practice Staff has left the room  [0A][Notification] You are connected with Yari Zheng Family Physician [0A][Notification] Sara Gay is located in [place]  [0A][Notification] Sara Gay has shared health   history  Mac Han  [0A]    Diagnosis  Contact w and exposure to oth viral communicable diseases  Cough  Nasal congestion    Procedures  Value: [id number] Code: CPT-4 UNLISTED E&M SERVICE    Medications Prescribed    No prescriptions ordered    Provider Notes  [0A][0A] Pt co st and congestion since yesterday  T 99 6  Had known covid exposure 6 and 2 days ago  some bodyaches, some nausea  No diarrhea, no change in taste or smell  Mild cough, no sob  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  [0A]PMH - none[0A]Meds -   none[0A]All - codeine, sulfa, ceph[0A]                         [0A]PE - in NAD[0A]Speaks full sentences, no sob  frequent sniffles[0A] [0A]AP â covid like illness â stable iwth known covid exposure[0A]Self quarantine[0A]recommend pt be tested for   covid 19[0A]Decongestants such as sudafed, nasal saline, or Neti-PotMucolytic with cough suppressant such as mucinex DMTylenol[0A]or ibuprofen for aches and pains  [0A]Sleep with head/chest elevated for better drainage  [0A]Follow   up[0A]with any worsening symptoms [0A]Please see more information about self quarantine    http://www soler com/ [0A] [0A]Home isolation for suspected[0A]infection (CLI) should be maintained for at least   10 days and at least 3 days[0A]symptom free  [0A] [0A]Home quarantine for[0A]high-risk contact should be maintained for 14 days from the date of high risk[0A]contact  [0A] [0A]Home Self-Isolation and Quarantine should generally include the[0A]following   principles: [0A]â¢ Stay at home except to receive medical care [0A]â¢ Separate yourself from other people and animals in the home [0A]â¢ Call ahead before visiting any health facility [0A]â¢ Wear a facemask if around others [0A]â¢ Cover your coughs   and sneezes [0A]â¢ Clean your hands often [0A]â¢ Clean âhigh touchâ surfaces every day [0A] [0A]Home Isolation (Covid-Like Illness) may be discontinued under the[0A]following conditions, per the CDC: [0A]â¢ You have had no fever for at least 72   hours (that is three full days[0A]of no fever without the use medicine that reduces fevers) [0A]â¢ AND [0A]â¢ other symptoms have improved (for example, when your cough or[0A]shortness of breath have improved) â¢ AND [0A]â¢ at least 10 days have   passed since your symptoms first appeared[0A] [0A] Please note that we are providing the[0A]covid 19  order as a courtesy  We are not[0A]able to reliably interpret these results  We cannot also assure that testing is[0A]available in your area  It is your   providerâs impression that you likely have[0A]COVID-19, this test is only for confirmation  If you have any questions, please[0A]call your primary care provider or your local health department  [0A] [0A]Testing for COVID-19- Lab testing for COVID-19   might be recommended if it is available[0A]in your community  To locate potential test sites in your[0A]area, please search the following websites:[0A] [0A]  [employer]  gov (https://[employer]/    [employer]  com[0A]      ([employer] is  com/blog/drive-thru-coronavirus-mvccjgj-momn-ml/) [0A]  [employer] (https://[employer]/corona-virus-testing-sites/)[0A]  If your[0A]      clinician has recommended lab testing for you,   please see the attached[0A]      referral form  [0A] [0A] [0A] [0A]Lab results:[0A][0A]Positive test  These tests are not 100% perfect but if[0A]you tested positive for COVID-19, this simply confirms that COVID-19 is the[0A]likely cause of your symptoms  You do not need to take further action unless[0A]you are experiencing worsening of your condition  You should follow all of the[0A]above guidance to avoid infecting others around you and should follow carefully[0A]the section called âDiscontinuing   Home Self-Insolationâ to determine when you[0A]are no longer contagious  [0A][0A]Negative test  This confirms that COVID-19 is not likely the[0A]cause of your symptoms   You probably are infected with another routine[0A]respiratory virus  These tests are   not 100% perfect, though, so there is still[0A]a small chance that Covid 19 is the cause of your symptoms  You can avoid[0A]strict isolation, unless there are very high risk patients in the home (for[0A]example- chronic disease, elderly, infants), but   you should still take[0A]precautions to prevent spread of any virus to others around you [0A][0A][0A]    Electronically signed by:  Angeles Davis(NPI [id number])

## 2021-01-04 ENCOUNTER — TELEMEDICINE (OUTPATIENT)
Dept: FAMILY MEDICINE CLINIC | Facility: CLINIC | Age: 41
End: 2021-01-04
Payer: COMMERCIAL

## 2021-01-04 VITALS — TEMPERATURE: 99.6 F

## 2021-01-04 DIAGNOSIS — Z20.822 EXPOSURE TO COVID-19 VIRUS: Primary | ICD-10-CM

## 2021-01-04 DIAGNOSIS — Z20.828 SARS-ASSOCIATED CORONAVIRUS EXPOSURE: Primary | ICD-10-CM

## 2021-01-04 PROCEDURE — U0003 INFECTIOUS AGENT DETECTION BY NUCLEIC ACID (DNA OR RNA); SEVERE ACUTE RESPIRATORY SYNDROME CORONAVIRUS 2 (SARS-COV-2) (CORONAVIRUS DISEASE [COVID-19]), AMPLIFIED PROBE TECHNIQUE, MAKING USE OF HIGH THROUGHPUT TECHNOLOGIES AS DESCRIBED BY CMS-2020-01-R: HCPCS | Performed by: FAMILY MEDICINE

## 2021-01-04 PROCEDURE — 99212 OFFICE O/P EST SF 10 MIN: CPT | Performed by: FAMILY MEDICINE

## 2021-01-04 NOTE — LETTER
January 4, 2021     Patient: Rina Gilman   YOB: 1980   Date of Visit: 1/4/2021       To Whom it May Concern:    Rina Gilman is under my professional care  She was seen virtually in my office on 1/4/2021  She needs to be excused from work 1/4/21 up to 1/6/21       If you have any questions or concerns, please don't hesitate to call           Sincerely,          Yvon Castillo, DO

## 2021-01-04 NOTE — PROGRESS NOTES
COVID-19 Virtual Visit     Assessment/Plan:    Problem List Items Addressed This Visit     None      Visit Diagnoses     SARS-associated coronavirus exposure    -  Primary    Pt stable  Treat supportively with OTC Cough/Cold Preps PRN, rest, & good PO hydration  Note for work  COV-19 PCR pending  Precautions given; self-isolation  [address]     Disposition:     I have spent 15 minutes directly with the patient  Greater than 50% of this time was spent in counseling/coordination of care regarding: prognosis, risks and benefits of treatment options, instructions for management, patient and family education, importance of treatment compliance, risk factor reductions and impressions  Encounter provider Raj Melara DO    Provider located at [address] [address]    Recent Visits  No visits were found meeting these conditions  Showing recent visits within past 7 days and meeting all other requirements     Today's Visits  Date Type Provider Dept   01/04/21 Telemedicine Tripp Singleton DO [facility]   Showing today's visits and meeting all other requirements     Future Appointments  No visits were found meeting these conditions  Showing future appointments within next 150 days and meeting all other requirements      This virtual check-in was done via SmartExposee and patient was informed that this is a secure, HIPAA-compliant platform  She agrees to proceed  Patient agrees to participate in a virtual check in via telephone or video visit instead of presenting to the office to address urgent/immediate medical needs  Patient is aware this is a billable service  After connecting through [facility], the patient was identified by name and date of birth  Lesley Liu was informed that this was a telemedicine visit and that the exam was being conducted confidentially over secure lines  My office door was closed   No one else was in the aurora Ridley acknowledged consent and understanding of privacy and security of the telemedicine visit  I informed the patient that I have reviewed her record in Epic and presented the opportunity for her to ask any questions regarding the visit today  The patient agreed to participate  Subjective:   Reinaldo Ridley is a 47 y o  female who has been screened for COVID-19  Symptom change since last report: unchanged  Date of symptom onset: 1/3/2021    Patient's symptoms include fever, nasal congestion, sore throat and myalgias  Patient denies anosmia, loss of taste, cough, shortness of breath, nausea and diarrhea  Crystal has been staying home and has isolated themselves in her home  She is taking care to not share personal items and is cleaning all surfaces that are touched often, like counters, tabletops, and doorknobs using household cleaning sprays or wipes  She is wearing a mask when she leaves her room  Exposed to his step-son last week, who tested POSITIVE subsequently for COVID-19  Pt did go for testing this am - COVID-19 PCR is pending  Lab Results   Component Value Date    SARSCOV2 Not Detected 11/28/2020     Past Medical History:   Diagnosis Date    Anxiety 2/17/2020    Overweight 2/23/2018    SOB (shortness of breath)      History reviewed  No pertinent surgical history  No current outpatient medications on file  No current facility-administered medications for this visit  Allergies   Allergen Reactions    Azithromycin Other (See Comments)     other    Codeine Hives    Keflex [Cephalexin] Other (See Comments)     Other      Promethazine Other (See Comments)     Other      Sulfamethoxazole-Trimethoprim Hives       Review of Systems   Constitutional: Positive for fever  HENT: Positive for congestion and sore throat  Respiratory: Negative for cough and shortness of breath  Gastrointestinal: Negative for diarrhea and nausea     Musculoskeletal: Positive for myalgias  Objective:    Vitals:    01/04/21 1356   Temp: 99 6 °F (37 6 °C)       Physical Exam  Vitals signs reviewed  Constitutional:       General: She is not in acute distress  Appearance: Normal appearance  She is not ill-appearing, toxic-appearing or diaphoretic  HENT:      Head: Normocephalic and atraumatic  Eyes:      General: No scleral icterus  Conjunctiva/sclera: Conjunctivae normal    Pulmonary:      Effort: Pulmonary effort is normal  No respiratory distress  Neurological:      Mental Status: She is alert and oriented to person, place, and time  Psychiatric:         Mood and Affect: Mood normal          Behavior: Behavior normal          Thought Content: Thought content normal          Judgment: Judgment normal        VIRTUAL VISIT DISCLAIMER    Jerica Sumnerilianacornel acknowledges that she has consented to an online visit or consultation  She understands that the online visit is based solely on information provided by her, and that, in the absence of a face-to-face physical evaluation by the physician, the diagnosis she receives is both limited and provisional in terms of accuracy and completeness  This is not intended to replace a full medical face-to-face evaluation by the physician  Jerica Ames understands and accepts these terms

## 2021-01-05 LAB — SARS-COV-2 RNA SPEC QL NAA+PROBE: NOT DETECTED

## 2021-01-06 ENCOUNTER — TELEPHONE (OUTPATIENT)
Dept: FAMILY MEDICINE CLINIC | Facility: CLINIC | Age: 41
End: 2021-01-06

## 2021-01-06 ENCOUNTER — TELEMEDICINE (OUTPATIENT)
Dept: FAMILY MEDICINE CLINIC | Facility: CLINIC | Age: 41
End: 2021-01-06
Payer: COMMERCIAL

## 2021-01-06 VITALS — TEMPERATURE: 100.1 F

## 2021-01-06 DIAGNOSIS — F41.9 ANXIETY: Primary | ICD-10-CM

## 2021-01-06 DIAGNOSIS — Z20.828 SARS-ASSOCIATED CORONAVIRUS EXPOSURE: ICD-10-CM

## 2021-01-06 DIAGNOSIS — F41.9 ANXIETY: ICD-10-CM

## 2021-01-06 DIAGNOSIS — Z20.828 SARS-ASSOCIATED CORONAVIRUS EXPOSURE: Primary | ICD-10-CM

## 2021-01-06 PROCEDURE — U0005 INFEC AGEN DETEC AMPLI PROBE: HCPCS | Performed by: FAMILY MEDICINE

## 2021-01-06 PROCEDURE — U0003 INFECTIOUS AGENT DETECTION BY NUCLEIC ACID (DNA OR RNA); SEVERE ACUTE RESPIRATORY SYNDROME CORONAVIRUS 2 (SARS-COV-2) (CORONAVIRUS DISEASE [COVID-19]), AMPLIFIED PROBE TECHNIQUE, MAKING USE OF HIGH THROUGHPUT TECHNOLOGIES AS DESCRIBED BY CMS-2020-01-R: HCPCS | Performed by: FAMILY MEDICINE

## 2021-01-06 PROCEDURE — 99213 OFFICE O/P EST LOW 20 MIN: CPT | Performed by: FAMILY MEDICINE

## 2021-01-06 RX ORDER — LORAZEPAM 0.5 MG/1
0.5 TABLET ORAL 2 TIMES DAILY PRN
Qty: 30 TABLET | Refills: 0 | Status: SHIPPED | OUTPATIENT
Start: 2021-01-06 | End: 2021-02-06 | Stop reason: SDUPTHER

## 2021-01-06 RX ORDER — ALPRAZOLAM 0.5 MG/1
0.5 TABLET ORAL 2 TIMES DAILY PRN
Qty: 30 TABLET | Refills: 0 | Status: SHIPPED | OUTPATIENT
Start: 2021-01-06 | End: 2021-01-06 | Stop reason: CLARIF

## 2021-01-06 NOTE — LETTER
January 6, 2021     Patient: Nalini Shipley   YOB: 1980   Date of Visit: 1/6/2021       To Whom it May Concern:    Nalini Shipley is under my professional care  She was seenvirtually in my office on 1/6/2021  She needs to be excused from 1/6/21 to 1/10/21  If you have any questions or concerns, please don't hesitate to call           Sincerely,          Tripp Mathew,         CC: No Recipients

## 2021-01-06 NOTE — PROGRESS NOTES
COVID-19 Virtual Visit     Assessment/Plan:    Problem List Items Addressed This Visit        Other    Anxiety      Other Visit Diagnoses     SARS-associated coronavirus exposure    -  Primary    Pt stable  OTC Cold Preps PRN, rest, & good hydration  Note for work  COV-19 PCR reordered - sxs concerning for COV-19  Precautions given; self-isolation  Relevant Orders    Novel Coronavirus (COVID-19), PCR [facility] - Collected at [employer] or [facility]      [id number]     Disposition:     I recommended continued isolation until at least 24 hours have passed since recovery defined as resolution of fever without the use of fever-reducing medications and improvement in respiratory symptoms (e g , cough, shortness of breath) AND 10 days have passed since onset of symptoms  I have spent 15 minutes directly with the patient  Greater than 50% of this time was spent in counseling/coordination of care regarding: diagnostic results, prognosis, risks and benefits of treatment options, instructions for management, patient and family education, importance of treatment compliance, risk factor reductions and impressions  Encounter provider Sid Zaldivar DO    Provider located at [address] [address]    Recent Visits  Date Type Provider Dept   01/04/21 Telemedicine Tripp Perry [facility] recent visits within past 7 days and meeting all other requirements     Today's Visits  Date Type Provider Dept   01/06/21 Telephone [address]   01/06/21 Telemedicine DO Justin Mejia   Showing today's visits and meeting all other requirements     Future Appointments  No visits were found meeting these conditions     Showing future appointments within next 150 days and meeting all other requirements      This virtual check-in was done via myJambi and patient was informed that this is a secure, HIPAA-compliant platform  She agrees to proceed  Patient agrees to participate in a virtual check in via telephone or video visit instead of presenting to the office to address urgent/immediate medical needs  Patient is aware this is a billable service  After connecting through [facility], the patient was identified by name and date of birth  Karthik Shepherd was informed that this was a telemedicine visit and that the exam was being conducted confidentially over secure lines  My office door was closed  No one else was in the room  Karthik Shepherd acknowledged consent and understanding of privacy and security of the telemedicine visit  I informed the patient that I have reviewed her record in Epic and presented the opportunity for her to ask any questions regarding the visit today  The patient agreed to participate  Subjective:   Karthik Shepherd is a 47 y o  female who has been screened for COVID-19  Symptom change since last report: unchanged  Patient's symptoms include fever, nasal congestion, sore throat, loss of taste and cough  Patient denies anosmia, shortness of breath and diarrhea  Crystal has been staying home and has isolated themselves in her home  She is taking care to not share personal items and is cleaning all surfaces that are touched often, like counters, tabletops, and doorknobs using household cleaning sprays or wipes  She is wearing a mask when she leaves her room  Pt did just have a NEGATIVE COVID-19 PCR Test, but symptoms very suspicious, mini given a known exposure  [place] PDMP was checked, and was clear  Lab Results   Component Value Date    SARSCOV2 Not Detected 01/04/2021     Past Medical History:   Diagnosis Date    Anxiety 2/17/2020    Overweight 2/23/2018    SOB (shortness of breath)      History reviewed  No pertinent surgical history    Current Outpatient Medications   Medication Sig Dispense Refill    LORazepam (ATIVAN) 0 5 mg tablet Take 1 tablet (0 5 mg total) by mouth 2 (two) times a day as needed for anxiety or sedation 30 tablet 0     No current facility-administered medications for this visit  Allergies   Allergen Reactions    Azithromycin Other (See Comments)     other    Codeine Hives    Keflex [Cephalexin] Other (See Comments)     Other      Promethazine Other (See Comments)     Other      Sulfamethoxazole-Trimethoprim Hives       Review of Systems   Constitutional: Positive for fever  HENT: Positive for congestion and sore throat  Respiratory: Positive for cough  Negative for shortness of breath  Gastrointestinal: Negative for diarrhea  Psychiatric/Behavioral: The patient is nervous/anxious  Feeling anxious with everything going on; having trouble sleeping, etc      Objective:    Vitals:    01/06/21 1439   Temp: 100 1 °F (37 8 °C)       Physical Exam  Vitals signs reviewed  Constitutional:       General: She is not in acute distress  Appearance: Normal appearance  She is not ill-appearing, toxic-appearing or diaphoretic  Comments: Pt with slight nasal congestion, but she is speaking in full sentences, etc    HENT:      Head: Normocephalic and atraumatic  Eyes:      General: No scleral icterus  Conjunctiva/sclera: Conjunctivae normal    Pulmonary:      Effort: Pulmonary effort is normal  No respiratory distress  Neurological:      Mental Status: She is alert and oriented to person, place, and time  Psychiatric:         Mood and Affect: Mood normal          Behavior: Behavior normal          Thought Content: Thought content normal          Judgment: Judgment normal      Crystal was seen today for virtual brief visit, covid-19 and covid-19  Diagnoses and all orders for this visit:    SARS-associated coronavirus exposure  Comments:  Pt stable  OTC Cold Preps PRN, rest, & good hydration  Note for work  COV-19 PCR reordered - sxs concerning for COV-19  Precautions given; self-isolation    Orders:  -     Novel Coronavirus (COVID-19), PCR [facility] - Collected at   [facility]; [facility]    Anxiety  Comments:  PRN Lorazepam ordered for the pt  [place] PDMP was [place]  Orders:  -     Discontinue: ALPRAZolam (XANAX) 0 5 mg tablet; Take 1 tablet (0 5 mg total) by mouth 2 (two) times a day as needed for anxiety or sleep        VIRTUAL VISIT DISCLAIMER    Jerica Ames acknowledges that she has consented to an online visit or consultation  She understands that the online visit is based solely on information provided by her, and that, in the absence of a face-to-face physical evaluation by the physician, the diagnosis she receives is both limited and provisional in terms of accuracy and completeness  This is not intended to replace a full medical face-to-face evaluation by the physician  Jerica Ames understands and accepts these terms

## 2021-01-06 NOTE — TELEPHONE ENCOUNTER
Pt called and she said the pharmacy said her alprazolam needs a prior auth  Pt asked if you could send something different to the pharmacy instead of waitin for a prior auth   pls advise

## 2021-01-07 LAB — SARS-COV-2 RNA SPEC QL NAA+PROBE: NOT DETECTED

## 2021-01-07 NOTE — RESULT ENCOUNTER NOTE
Please let the patient know that their repeat COVID-19 PCR testing was NEGATIVE  Thanks     Dr Hilary Joshi

## 2021-01-08 ENCOUNTER — TELEMEDICINE (OUTPATIENT)
Dept: FAMILY MEDICINE CLINIC | Facility: CLINIC | Age: 41
End: 2021-01-08
Payer: COMMERCIAL

## 2021-01-08 DIAGNOSIS — U07.1 COVID-19: Primary | ICD-10-CM

## 2021-01-08 PROCEDURE — 99213 OFFICE O/P EST LOW 20 MIN: CPT | Performed by: FAMILY MEDICINE

## 2021-01-08 NOTE — LETTER
January 8, 2021     Patient: Bee Carter   YOB: 1980   Date of Visit: 1/8/2021       To Whom it May Concern:    Bee Carter is under my professional care  She was seen virtually in my office on 1/8/2021  She needs to be excused from 1/4/2021 through 1/17/2021  If you have any questions or concerns, please don't hesitate to call           Sincerely,          Roberto Dailey, DO

## 2021-01-08 NOTE — PROGRESS NOTES
COVID-19 Virtual Visit     Assessment/Plan:    Problem List Items Addressed This Visit     None      Visit Diagnoses     COVID-19    -  Primary    Pt stable  OTC Cold Preps PRN, rest, & good hydration  Note for work  COV-19 Rapid Testing+  Precautions given; self-isolation  [address]     Disposition:     I recommended continued isolation until at least 24 hours have passed since recovery defined as resolution of fever without the use of fever-reducing medications AND improvement in COVID symptoms AND 10 days have passed since onset of symptoms (or 10 days have passed since date of first positive viral diagnostic test for asymptomatic patients)  I have spent 15 minutes directly with the patient  Greater than 50% of this time was spent in counseling/coordination of care regarding: diagnostic results, prognosis, risks and benefits of treatment options, instructions for management, patient and family education, importance of treatment compliance, risk factor reductions and impressions  Encounter provider Jonathan Cordero DO    Provider located at [address] [address]    Recent Visits  Date Type Provider Dept   01/08/21 Telemedicine Jonathan Cordero, [address]   01/06/21 Telephone [address]   01/06/21 Telemedicine Tripp Nava DO [facility]   01/04/21 Telemedicine Tripp Nava DO [facility]   Showing recent visits within past 7 days and meeting all other requirements     Future Appointments  No visits were found meeting these conditions  Showing future appointments within next 150 days and meeting all other requirements          Subjective:   Esther Toth is a 47 y o  female who has been screened for COVID-19  Patient's symptoms include fever, fatigue, anosmia, loss of taste, cough, diarrhea, myalgias and headache  Patient denies shortness of breath         Date of symptom onset: 1/3/2021    Crystal tested negative x 2 by COV-19 nasal PCR; tested POSITIVE though yesterday by Rapid testing at [facility]       Lab Results   Component Value Date    Leola Perrin Not Detected 01/06/2021     Past Medical History:   Diagnosis Date    Anxiety 2/17/2020    Overweight 2/23/2018    SOB (shortness of breath)      History reviewed  No pertinent surgical history  Current Outpatient Medications   Medication Sig Dispense Refill    LORazepam (ATIVAN) 0 5 mg tablet Take 1 tablet (0 5 mg total) by mouth 2 (two) times a day as needed for anxiety or sedation 30 tablet 0     No current facility-administered medications for this visit  Allergies   Allergen Reactions    Azithromycin Other (See Comments)     other    Codeine Hives    Keflex [Cephalexin] Other (See Comments)     Other      Promethazine Other (See Comments)     Other      Sulfamethoxazole-Trimethoprim Hives       Review of Systems   Constitutional: Positive for fatigue and fever  Respiratory: Positive for cough  Negative for shortness of breath  Gastrointestinal: Positive for diarrhea  Musculoskeletal: Positive for myalgias  Neurological: Positive for headaches  Objective: There were no vitals filed for this visit  Physical Exam  Constitutional:       General: She is not in acute distress  Appearance: Normal appearance  She is not ill-appearing, toxic-appearing or diaphoretic  Comments: Pt is tired today and a little congested; but she is non-toxic appearing, speaking in full sentences  HENT:      Head: Normocephalic and atraumatic  Eyes:      General: No scleral icterus  Conjunctiva/sclera: Conjunctivae normal    Pulmonary:      Effort: Pulmonary effort is normal  No respiratory distress  Neurological:      Mental Status: She is alert and oriented to person, place, and time  Psychiatric:         Mood and Affect: Mood normal          Behavior: Behavior normal          Thought Content:  Thought content normal  Judgment: Judgment normal        VIRTUAL VISIT DISCLAIMER    Jerica Ames acknowledges that she has consented to an online visit or consultation  She understands that the online visit is based solely on information provided by her, and that, in the absence of a face-to-face physical evaluation by the physician, the diagnosis she receives is both limited and provisional in terms of accuracy and completeness  This is not intended to replace a full medical face-to-face evaluation by the physician  Jerica Ames understands and accepts these terms

## 2021-01-14 ENCOUNTER — TELEMEDICINE (OUTPATIENT)
Dept: FAMILY MEDICINE CLINIC | Facility: CLINIC | Age: 41
End: 2021-01-14
Payer: COMMERCIAL

## 2021-01-14 VITALS — TEMPERATURE: 100.3 F

## 2021-01-14 DIAGNOSIS — U07.1 COVID-19: Primary | ICD-10-CM

## 2021-01-14 DIAGNOSIS — J01.01 ACUTE RECURRENT MAXILLARY SINUSITIS: ICD-10-CM

## 2021-01-14 PROCEDURE — 99213 OFFICE O/P EST LOW 20 MIN: CPT | Performed by: FAMILY MEDICINE

## 2021-01-14 RX ORDER — AMOXICILLIN AND CLAVULANATE POTASSIUM 875; 125 MG/1; MG/1
1 TABLET, FILM COATED ORAL 2 TIMES DAILY WITH MEALS
Qty: 20 TABLET | Refills: 0 | Status: SHIPPED | OUTPATIENT
Start: 2021-01-14 | End: 2021-01-24

## 2021-01-14 NOTE — LETTER
January 14, 2021     Patient: Zeina Sinha   YOB: 1980   Date of Visit: 1/14/2021       To Whom it May Concern:    Zeina Sinha is under my professional care  She was seen virtually in my office on 1/14/2021  She needs to be excused  1/18/21 through 1/19/2021    If you have any questions or concerns, please don't hesitate to call           Sincerely,          Maricarmen Shaw, DO

## 2021-01-14 NOTE — PROGRESS NOTES
COVID-19 Virtual Visit     Assessment/Plan:    Problem List Items Addressed This Visit     None      Visit Diagnoses     COVID-19    -  Primary    Pt stable  OTC Cold Preps PRN, rest, & good hydration  Note for work - time out, extended due to fevers  Precautions given; self-isolation  Acute recurrent maxillary sinusitis        Possibly extension of above, and adding to fevers  Treat with Augmentin, warm salt water gargles, OTC Cough/Cold Preps PRN, rest, and good PO hydration  Relevant Medications    amoxicillin-clavulanate (AUGMENTIN) 875-125 mg per tablet      [address]     Disposition:     I recommended continued isolation until at least 24 hours have passed since recovery defined as resolution of fever without the use of fever-reducing medications AND improvement in COVID symptoms AND 10 days have passed since onset of symptoms (or 10 days have passed since date of first positive viral diagnostic test for asymptomatic patients)  I have spent 15 minutes directly with the patient  Greater than 50% of this time was spent in counseling/coordination of care regarding: prognosis, risks and benefits of treatment options, instructions for management, patient and family education, importance of treatment compliance, risk factor reductions and impressions  Encounter provider Jonathan Cordero DO    Provider located at [address] [address]    Recent Visits  Date Type Provider Dept   01/08/21 Telemedicine Ean Mata [facility] recent visits within past 7 days and meeting all other requirements     Today's Visits  Date Type Provider Dept   01/14/21 Telemedicine DO Justin Mata   Showing today's visits and meeting all other requirements     Future Appointments  No visits were found meeting these conditions     Showing future appointments within next 150 days and meeting all other requirements This virtual check-in was done via [employer] and patient was informed that this is a secure, HIPAA-compliant platform  She agrees to proceed  Patient agrees to participate in a virtual check in via telephone or video visit instead of presenting to the office to address urgent/immediate medical needs  Patient is aware this is a billable service  After connecting through [facility], the patient was identified by name and date of birth  Abimael Benavides was informed that this was a telemedicine visit and that the exam was being conducted confidentially over secure lines  My office door was closed  No one else was in the room  Abimael Benavides acknowledged consent and understanding of privacy and security of the telemedicine visit  I informed the patient that I have reviewed her record in Epic and presented the opportunity for her to ask any questions regarding the visit today  The patient agreed to participate  Subjective:   Abimael Benavides is a 47 y o  female who has been screened for COVID-19  Symptom change since last report: unchanged  Patient's symptoms include fever, fatigue, nasal congestion, anosmia, loss of taste, cough and diarrhea  Patient denies shortness of breath  Jerica has been staying home and has isolated themselves in her home  She is taking care to not share personal items and is cleaning all surfaces that are touched often, like counters, tabletops, and doorknobs using household cleaning sprays or wipes  She is wearing a mask when she leaves her room  Date of symptom onset: 1/3/2021    Jerica still having low grade temps at this time  Now with GI issues, and sinus pain  Lab Results   Component Value Date    SARSCOV2 Not Detected 01/06/2021     Past Medical History:   Diagnosis Date    Anxiety 2/17/2020    Overweight 2/23/2018    SOB (shortness of breath)      History reviewed  No pertinent surgical history    Current Outpatient Medications   Medication Sig Dispense Refill  LORazepam (ATIVAN) 0 5 mg tablet Take 1 tablet (0 5 mg total) by mouth 2 (two) times a day as needed for anxiety or sedation 30 tablet 0    amoxicillin-clavulanate (AUGMENTIN) 875-125 mg per tablet Take 1 tablet by mouth 2 (two) times a day with meals for 10 days 20 tablet 0     No current facility-administered medications for this visit  Allergies   Allergen Reactions    Azithromycin Other (See Comments)     other    Codeine Hives    Keflex [Cephalexin] Other (See Comments)     Other      Promethazine Other (See Comments)     Other      Sulfamethoxazole-Trimethoprim Hives       Review of Systems   Constitutional: Positive for fatigue and fever  HENT: Positive for congestion and sinus pain  Respiratory: Positive for cough  Negative for shortness of breath  Gastrointestinal: Positive for diarrhea  Objective:    Vitals:    01/14/21 1330   Temp: 100 3 °F (37 9 °C)       Physical Exam  Vitals signs reviewed  Constitutional:       General: She is not in acute distress  Appearance: Normal appearance  She is not ill-appearing, toxic-appearing or diaphoretic  Comments: Pt with nasal congestion, but is non-toxic appearing  She is speaking in full sentences  HENT:      Head: Normocephalic and atraumatic  Eyes:      General: No scleral icterus  Conjunctiva/sclera: Conjunctivae normal    Pulmonary:      Effort: Pulmonary effort is normal  No respiratory distress  Neurological:      Mental Status: She is alert and oriented to person, place, and time  Psychiatric:         Mood and Affect: Mood normal          Behavior: Behavior normal          Thought Content: Thought content normal          Judgment: Judgment normal        VIRTUAL VISIT DISCLAIMER    Jerica Ames acknowledges that she has consented to an online visit or consultation   She understands that the online visit is based solely on information provided by her, and that, in the absence of a face-to-face physical evaluation by the physician, the diagnosis she receives is both limited and provisional in terms of accuracy and completeness  This is not intended to replace a full medical face-to-face evaluation by the physician  Jerica Ames understands and accepts these terms

## 2021-01-18 ENCOUNTER — TELEMEDICINE (OUTPATIENT)
Dept: FAMILY MEDICINE CLINIC | Facility: CLINIC | Age: 41
End: 2021-01-18
Payer: COMMERCIAL

## 2021-01-18 VITALS — TEMPERATURE: 100.3 F

## 2021-01-18 DIAGNOSIS — J01.01 ACUTE RECURRENT MAXILLARY SINUSITIS: ICD-10-CM

## 2021-01-18 DIAGNOSIS — U07.1 COVID-19: Primary | ICD-10-CM

## 2021-01-18 PROCEDURE — 99213 OFFICE O/P EST LOW 20 MIN: CPT | Performed by: FAMILY MEDICINE

## 2021-01-18 RX ORDER — BENZONATATE 200 MG/1
200 CAPSULE ORAL 3 TIMES DAILY PRN
Qty: 20 CAPSULE | Refills: 0 | Status: SHIPPED | OUTPATIENT
Start: 2021-01-18

## 2021-01-18 NOTE — PROGRESS NOTES
COVID-19 Virtual Visit     Assessment/Plan:    Problem List Items Addressed This Visit     None      Visit Diagnoses     COVID-19    -  Primary    Pt stable  OTC Cold Preps PRN, rest, & good hydration  Note for work - time out, extended again  Precautions given; self-isolation  Relevant Medications    benzonatate (TESSALON) 200 MG capsule    Acute recurrent maxillary sinusitis        Possibly extension of above, and adding to fevers  Treat with Augmentin (pharmacy now has it!), OTC Cough/Cold Preps PRN, rest, & good hydration  Relevant Medications    benzonatate (TESSALON) 200 MG capsule      [id number]     Disposition:     I recommended continued isolation until at least 24 hours have passed since recovery defined as resolution of fever without the use of fever-reducing medications AND improvement in COVID symptoms AND 10 days have passed since onset of symptoms (or 10 days have passed since date of first positive viral diagnostic test for asymptomatic patients)  I have spent 15 minutes directly with the patient  Greater than 50% of this time was spent in counseling/coordination of care regarding: prognosis, risks and benefits of treatment options, instructions for management, patient and family education, importance of treatment compliance, risk factor reductions and impressions  Encounter provider Alexia Mcallister DO    Provider located at [address] [address]    Recent Visits  Date Type Provider Dept   01/14/21 Telemedicine Ean Howard recent visits within past 7 days and meeting all other requirements     Today's Visits  Date Type Provider Dept   01/18/21 Telemedicine DO [facility]   Showing today's visits and meeting all other requirements     Future Appointments  No visits were found meeting these conditions     Showing future appointments within next 150 days and meeting all other requirements      This virtual check-in was done via [employer] and patient was informed that this is a secure, HIPAA-compliant platform  She agrees to proceed  Patient agrees to participate in a virtual check in via telephone or video visit instead of presenting to the office to address urgent/immediate medical needs  Patient is aware this is a billable service  After connecting through [employer], the patient was identified by name and date of birth  Kayla Rangel was informed that this was a telemedicine visit and that the exam was being conducted confidentially over secure lines  My office door was closed  No one else was in the room  Kayla Rangel acknowledged consent and understanding of privacy and security of the telemedicine visit  I informed the patient that I have reviewed her record in Epic and presented the opportunity for her to ask any questions regarding the visit today  The patient agreed to participate  Subjective:   Kayla Rangel is a 47 y o  female who has been screened for COVID-19  Symptom change since last report: unchanged  Patient's symptoms include fever, fatigue, nasal congestion, cough and diarrhea  Patient denies shortness of breath  Jerica has been staying home and has isolated themselves in her home  She is taking care to not share personal items and is cleaning all surfaces that are touched often, like counters, tabletops, and doorknobs using household cleaning sprays or wipes  She is wearing a mask when she leaves her room  Date of symptom onset: 1/3/2021    Jerica went to get Augmentin prescribed 1/14/21 - pharmacy did not have; she is going today to       Lab Results   Component Value Date    [facility] 98 Not Detected 01/06/2021     Past Medical History:   Diagnosis Date    Anxiety 2/17/2020    Overweight 2/23/2018    SOB (shortness of breath)      History reviewed  No pertinent surgical history    Current Outpatient Medications Medication Sig Dispense Refill    amoxicillin-clavulanate (AUGMENTIN) 875-125 mg per tablet Take 1 tablet by mouth 2 (two) times a day with meals for 10 days 20 tablet 0    LORazepam (ATIVAN) 0 5 mg tablet Take 1 tablet (0 5 mg total) by mouth 2 (two) times a day as needed for anxiety or sedation 30 tablet 0    benzonatate (TESSALON) 200 MG capsule Take 1 capsule (200 mg total) by mouth 3 (three) times a day as needed for cough 20 capsule 0     No current facility-administered medications for this visit  Allergies   Allergen Reactions    Azithromycin Other (See Comments)     other    Codeine Hives    Keflex [Cephalexin] Other (See Comments)     Other      Promethazine Other (See Comments)     Other      Sulfamethoxazole-Trimethoprim Hives       Review of Systems   Constitutional: Positive for fatigue and fever  HENT: Positive for congestion and sinus pain  Respiratory: Positive for cough  Negative for shortness of breath  Gastrointestinal: Positive for diarrhea  Objective:    Vitals:    01/18/21 1615   Temp: 100 3 °F (37 9 °C)       Physical Exam  Constitutional:       General: She is not in acute distress  Appearance: Normal appearance  She is not ill-appearing, toxic-appearing or diaphoretic  Comments: Pt still with nasal congestion, but is non-toxic appearing  She is speaking in full sentences  HENT:      Head: Normocephalic and atraumatic  Eyes:      General: No scleral icterus  Conjunctiva/sclera: Conjunctivae normal    Pulmonary:      Effort: Pulmonary effort is normal  No respiratory distress  Neurological:      Mental Status: She is alert and oriented to person, place, and time  Psychiatric:         Mood and Affect: Mood normal          Behavior: Behavior normal          Thought Content:  Thought content normal          Judgment: Judgment normal        VIRTUAL VISIT DISCLAIMER    Jerica Ames acknowledges that she has consented to an online visit or consultation  She understands that the online visit is based solely on information provided by her, and that, in the absence of a face-to-face physical evaluation by the physician, the diagnosis she receives is both limited and provisional in terms of accuracy and completeness  This is not intended to replace a full medical face-to-face evaluation by the physician  Jerica Ames understands and accepts these terms

## 2021-01-18 NOTE — LETTER
January 18, 2021     Patient: Rina Gilman   YOB: 1980   Date of Visit: 1/18/2021       To Whom it May Concern:    Rina Gilman is under my professional care  She was virtually seen in my office on 1/18/2021  She needs to continue to be excused from 1/18/2021 through 1/24/2021  If you have any questions or concerns, please don't hesitate to call           Sincerely,          Yvon Castillo, DO

## 2021-01-22 ENCOUNTER — TELEMEDICINE (OUTPATIENT)
Dept: FAMILY MEDICINE CLINIC | Facility: CLINIC | Age: 41
End: 2021-01-22
Payer: COMMERCIAL

## 2021-01-22 VITALS — TEMPERATURE: 100 F

## 2021-01-22 DIAGNOSIS — R51.9 ACUTE NONINTRACTABLE HEADACHE, UNSPECIFIED HEADACHE TYPE: ICD-10-CM

## 2021-01-22 DIAGNOSIS — J01.01 ACUTE RECURRENT MAXILLARY SINUSITIS: ICD-10-CM

## 2021-01-22 DIAGNOSIS — U07.1 COVID-19: Primary | ICD-10-CM

## 2021-01-22 PROCEDURE — 99213 OFFICE O/P EST LOW 20 MIN: CPT | Performed by: FAMILY MEDICINE

## 2021-01-22 RX ORDER — PREDNISONE 20 MG/1
40 TABLET ORAL DAILY
Qty: 10 TABLET | Refills: 0 | Status: SHIPPED | OUTPATIENT
Start: 2021-01-22 | End: 2021-01-27

## 2021-01-22 NOTE — LETTER
January 22, 2021     Patient: Billy Alamo   YOB: 1980   Date of Visit: 1/22/2021       To Whom it May Concern:    Billy Alamo is under my professional care  She was seen virtually in my office on 1/22/2021  She needs to be excused from 1/25/21 through 1/27/21  If you have any questions or concerns, please don't hesitate to call           Sincerely,          Eran Gill, DO

## 2021-01-22 NOTE — PROGRESS NOTES
COVID-19 Virtual Visit     Assessment/Plan:    Problem List Items Addressed This Visit     None      Visit Diagnoses     COVID-19    -  Primary    Pt stable  OTC Cold Preps PRN, rest, & hydration  Pt to finish Augmentin; add Prednisone burst  Note for work  Precautions given; self-isolation  Relevant Medications    predniSONE 20 mg tablet    Acute recurrent maxillary sinusitis        As above  Relevant Medications    predniSONE 20 mg tablet    Acute nonintractable headache, unspecified headache type        As above  Relevant Medications    predniSONE 20 mg tablet      [id number]     Disposition:     I recommended continued isolation until at least 24 hours have passed since recovery defined as resolution of fever without the use of fever-reducing medications AND improvement in COVID symptoms AND 10 days have passed since onset of symptoms (or 10 days have passed since date of first positive viral diagnostic test for asymptomatic patients)  I have spent 15 minutes directly with the patient  Greater than 50% of this time was spent in counseling/coordination of care regarding: prognosis, risks and benefits of treatment options, instructions for management, patient and family education, importance of treatment compliance, risk factor reductions and impressions  Encounter provider Phyllis Munoz DO    Provider located at [address] [address]    Recent Visits  Date Type Provider Dept   01/18/21 Telemedicine Ean Howard recent visits within past 7 days and meeting all other requirements     Today's Visits  Date Type Provider Dept   01/22/21 Telemedicine DO Justin [facility]   Showing today's visits and meeting all other requirements     Future Appointments  No visits were found meeting these conditions     Showing future appointments within next 150 days and meeting all other requirements      This virtual check-in was done via [employer] and patient was informed that this is a secure, HIPAA-compliant platform  She agrees to proceed  Patient agrees to participate in a virtual check in via telephone or video visit instead of presenting to the office to address urgent/immediate medical needs  Patient is aware this is a billable service  After connecting through [facility], the patient was identified by name and date of birth  Manny John was informed that this was a telemedicine visit and that the exam was being conducted confidentially over secure lines  My office door was closed  No one else was in the room  Manny John acknowledged consent and understanding of privacy and security of the telemedicine visit  I informed the patient that I have reviewed her record in Epic and presented the opportunity for her to ask any questions regarding the visit today  The patient agreed to participate  Subjective:   Manny John is a 47 y o  female who has been screened for COVID-19  Symptom change since last report: unchanged  Patient's symptoms include fever, cough, diarrhea and headache  Patient denies shortness of breath  Crystal has been staying home and has isolated themselves in her home  She is taking care to not share personal items and is cleaning all surfaces that are touched often, like counters, tabletops, and doorknobs using household cleaning sprays or wipes  She is wearing a mask when she leaves her room  Date of symptom onset: 1/3/2021    Pt with occasional, light MORGAN at times  Still not feeling well  Diarrhea subsiding  Is on Augmentin - day #4  Lab Results   Component Value Date    SARSCOV2 Not Detected 01/06/2021     Past Medical History:   Diagnosis Date    Anxiety 2/17/2020    Overweight 2/23/2018    SOB (shortness of breath)      History reviewed  No pertinent surgical history    Current Outpatient Medications   Medication Sig Dispense Refill  amoxicillin-clavulanate (AUGMENTIN) 875-125 mg per tablet Take 1 tablet by mouth 2 (two) times a day with meals for 10 days 20 tablet 0    benzonatate (TESSALON) 200 MG capsule Take 1 capsule (200 mg total) by mouth 3 (three) times a day as needed for cough 20 capsule 0    LORazepam (ATIVAN) 0 5 mg tablet Take 1 tablet (0 5 mg total) by mouth 2 (two) times a day as needed for anxiety or sedation 30 tablet 0    predniSONE 20 mg tablet Take 2 tablets (40 mg total) by mouth daily for 5 days 10 tablet 0     No current facility-administered medications for this visit  Allergies   Allergen Reactions    Azithromycin Other (See Comments)     other    Codeine Hives    Keflex [Cephalexin] Other (See Comments)     Other      Promethazine Other (See Comments)     Other      Sulfamethoxazole-Trimethoprim Hives       Review of Systems   Constitutional: Positive for fever  HENT: Positive for sinus pressure  Respiratory: Positive for cough  Negative for shortness of breath  Gastrointestinal: Positive for diarrhea  Musculoskeletal: Positive for back pain  Neurological: Positive for headaches  Objective:    Vitals:    01/22/21 1018   Temp: 100 °F (37 8 °C)   TempSrc: Temporal       Physical Exam  Vitals signs reviewed  Constitutional:       General: She is not in acute distress  Appearance: Normal appearance  She is not ill-appearing, toxic-appearing or diaphoretic  Comments: Pt still sounds mildly, nasally congested; she is non-toxic appearing, and speaking in full sentences  HENT:      Head: Normocephalic and atraumatic  Eyes:      General: No scleral icterus  Conjunctiva/sclera: Conjunctivae normal    Pulmonary:      Effort: Pulmonary effort is normal  No respiratory distress  Neurological:      Mental Status: She is alert and oriented to person, place, and time     Psychiatric:         Mood and Affect: Mood normal          Behavior: Behavior normal          Thought Content: Thought content normal          Judgment: Judgment normal        VIRTUAL VISIT DISCLAIMER    Jerica Ames acknowledges that she has consented to an online visit or consultation  She understands that the online visit is based solely on information provided by her, and that, in the absence of a face-to-face physical evaluation by the physician, the diagnosis she receives is both limited and provisional in terms of accuracy and completeness  This is not intended to replace a full medical face-to-face evaluation by the physician  Jerica Ames understands and accepts these terms

## 2021-01-25 ENCOUNTER — TELEMEDICINE (OUTPATIENT)
Dept: FAMILY MEDICINE CLINIC | Facility: CLINIC | Age: 41
End: 2021-01-25
Payer: COMMERCIAL

## 2021-01-25 ENCOUNTER — APPOINTMENT (OUTPATIENT)
Dept: RADIOLOGY | Age: 41
End: 2021-01-25
Payer: COMMERCIAL

## 2021-01-25 ENCOUNTER — OFFICE VISIT (OUTPATIENT)
Dept: URGENT CARE | Age: 41
End: 2021-01-25
Payer: COMMERCIAL

## 2021-01-25 ENCOUNTER — TELEPHONE (OUTPATIENT)
Dept: URGENT CARE | Age: 41
End: 2021-01-25

## 2021-01-25 VITALS — TEMPERATURE: 100 F

## 2021-01-25 VITALS — TEMPERATURE: 97.9 F | OXYGEN SATURATION: 93 % | HEART RATE: 117 BPM | RESPIRATION RATE: 20 BRPM

## 2021-01-25 DIAGNOSIS — U07.1 COVID-19: Primary | ICD-10-CM

## 2021-01-25 DIAGNOSIS — U07.1 COVID-19: ICD-10-CM

## 2021-01-25 DIAGNOSIS — J01.01 ACUTE RECURRENT MAXILLARY SINUSITIS: ICD-10-CM

## 2021-01-25 PROCEDURE — 99213 OFFICE O/P EST LOW 20 MIN: CPT | Performed by: FAMILY MEDICINE

## 2021-01-25 PROCEDURE — 1036F TOBACCO NON-USER: CPT | Performed by: FAMILY MEDICINE

## 2021-01-25 PROCEDURE — 71046 X-RAY EXAM CHEST 2 VIEWS: CPT

## 2021-01-25 PROCEDURE — 99213 OFFICE O/P EST LOW 20 MIN: CPT | Performed by: NURSE PRACTITIONER

## 2021-01-25 RX ORDER — ALBUTEROL SULFATE 90 UG/1
2 AEROSOL, METERED RESPIRATORY (INHALATION) EVERY 6 HOURS PRN
Qty: 1 INHALER | Refills: 1 | Status: SHIPPED | OUTPATIENT
Start: 2021-01-25

## 2021-01-25 NOTE — PROGRESS NOTES
COVID-19 Virtual Visit     Assessment/Plan:    Problem List Items Addressed This Visit     None      Visit Diagnoses     COVID-19    -  Primary    Pt stable  OTC Cold Preps PRN, rest, & hydration  To finish Augmentin; Prednisone burst  Albuterol PRN  Precautions given; self-isolation  Resp Clinic  Relevant Medications    albuterol (PROVENTIL HFA,VENTOLIN HFA) 90 mcg/act inhaler    Acute recurrent maxillary sinusitis        As above  [address]123     Disposition:     I referred patient to one of our COVID-19 Respiratory Clinics  I recommended continued isolation until at least 24 hours have passed since recovery defined as resolution of fever without the use of fever-reducing medications AND improvement in COVID symptoms AND 10 days have passed since onset of symptoms (or 10 days have passed since date of first positive viral diagnostic test for asymptomatic patients)  I have spent 15 minutes directly with the patient  Greater than 50% of this time was spent in counseling/coordination of care regarding: prognosis, risks and benefits of treatment options, instructions for management, patient and family education, importance of treatment compliance, risk factor reductions and impressions  Discussed with Jerica at length today -> given the persistent fevers, having some intermittent chest tightness, prolonged course of illness, etc, we will set her upt to be seen at a Respiratory Clinic (ambulatory pulse-ox, CV/Pulm examination, and likely chest xray)  We are adding a PRN Albuterol HFA to her therapy as well today         Encounter provider Genaro Blake DO    Provider located at [address] [address]    Recent Visits  Date Type Provider Dept   01/22/21 Telemedicine Genaro Blake, [address]   01/18/21 Telemedicine Tripp Singleton DO [facility]   Showing recent visits within past 7 days and meeting all other requirements Today's Visits  Date Type Provider Dept   01/25/21 Telemedicine Tripp Zhu, DO [facility]   Showing today's visits and meeting all other requirements     Future Appointments  No visits were found meeting these conditions  Showing future appointments within next 150 days and meeting all other requirements      This virtual check-in was done via [employer] and patient was informed that this is not a secure, HIPAA-compliant platform  She agrees to proceed  Patient agrees to participate in a virtual check in via telephone or video visit instead of presenting to the office to address urgent/immediate medical needs  Patient is aware this is a billable service  After connecting through [facility], the patient was identified by name and date of birth  Benigno Ramirez was informed that this was a telemedicine visit and that the exam was being conducted confidentially over secure lines  My office door was closed  No one else was in the room  Benigno Ramirez acknowledged consent and understanding of privacy and security of the telemedicine visit  I informed the patient that I have reviewed her record in Epic and presented the opportunity for her to ask any questions regarding the visit today  The patient agreed to participate  Subjective:   Benigno Ramirez is a 47 y o  female who has been screened for COVID-19  Symptom change since last report: unchanged  Patient's symptoms include fever, sore throat, cough, chest tightness, nausea and headache  Patient denies shortness of breath  Jerica has been staying home and has isolated themselves in her home  She is taking care to not share personal items and is cleaning all surfaces that are touched often, like counters, tabletops, and doorknobs using household cleaning sprays or wipes  She is wearing a mask when she leaves her room       Date of symptom onset: 1/3/2021    Jerica is being seen in f/u today - we started a Prednisone burst on her 1/22/21 - headaches are mildly improved  Cough is still bad at night, nauseated, some chest tightness  Lab Results   Component Value Date    SARSCOV2 Not Detected 01/06/2021     Past Medical History:   Diagnosis Date    Anxiety 2/17/2020    Overweight 2/23/2018    SOB (shortness of breath)      History reviewed  No pertinent surgical history  Current Outpatient Medications   Medication Sig Dispense Refill    albuterol (PROVENTIL HFA,VENTOLIN HFA) 90 mcg/act inhaler Inhale 2 puffs every 6 (six) hours as needed for wheezing or shortness of breath 1 Inhaler 1    benzonatate (TESSALON) 200 MG capsule Take 1 capsule (200 mg total) by mouth 3 (three) times a day as needed for cough 20 capsule 0    LORazepam (ATIVAN) 0 5 mg tablet Take 1 tablet (0 5 mg total) by mouth 2 (two) times a day as needed for anxiety or sedation 30 tablet 0    predniSONE 20 mg tablet Take 2 tablets (40 mg total) by mouth daily for 5 days 10 tablet 0     No current facility-administered medications for this visit  Allergies   Allergen Reactions    Azithromycin Other (See Comments)     other    Codeine Hives    Keflex [Cephalexin] Other (See Comments)     Other      Promethazine Other (See Comments)     Other      Sulfamethoxazole-Trimethoprim Hives       Review of Systems   Constitutional: Positive for fever  HENT: Positive for sore throat  Respiratory: Positive for cough and chest tightness  Negative for shortness of breath  Gastrointestinal: Positive for nausea  Neurological: Positive for headaches  Objective:    Vitals:    01/25/21 0938   Temp: 100 °F (37 8 °C)   TempSrc: Temporal       Physical Exam  Vitals signs reviewed  Constitutional:       General: She is not in acute distress  Appearance: Normal appearance  She is not ill-appearing, toxic-appearing or diaphoretic        Comments: Again, Crystal sounds tired and congested - she is non-toxic appearing, speaking in full sentences, etc    HENT:      Head: Normocephalic and atraumatic  Eyes:      General: No scleral icterus  Conjunctiva/sclera: Conjunctivae normal    Pulmonary:      Effort: Pulmonary effort is normal  No respiratory distress  Neurological:      Mental Status: She is alert and oriented to person, place, and time  Psychiatric:         Mood and Affect: Mood normal          Behavior: Behavior normal          Thought Content: Thought content normal          Judgment: Judgment normal        VIRTUAL VISIT DISCLAIMER    Jerica Ames acknowledges that she has consented to an online visit or consultation  She understands that the online visit is based solely on information provided by her, and that, in the absence of a face-to-face physical evaluation by the physician, the diagnosis she receives is both limited and provisional in terms of accuracy and completeness  This is not intended to replace a full medical face-to-face evaluation by the physician  Jerica Dashacornel understands and accepts these terms

## 2021-01-25 NOTE — PROGRESS NOTES
[facility]        NAME: Brennan Burger is a 47 y o  female  : 1980    MRN: [id number]  DATE: 2021  TIME: 3:23 PM    Assessment and Plan   COVID-19 [U07 1]  1  COVID-19  XR chest pa & lateral         Patient Instructions       Follow up with PCP in 3-5 days  Proceed to  ER if symptoms worsen  Chief Complaint     Chief Complaint   Patient presents with    Fever     augmentin last dose x yesterday   COVID-19     positive 2021,sent by PCP , with persistant fever,chest tightness,and prolonged illness  respiratory clinic          History of Present Illness       HPI   Reports she has been diagnosed with covid 19  Started having chest tightness today  Albuterol inhaler has been prescribed by PCP and patient states she will get it picked up this evening  Low grade temp  Currently on steroid  Just finished Augmentin  Review of Systems   Review of Systems   Constitutional: Positive for fever  HENT: Positive for rhinorrhea  Negative for congestion, sore throat and trouble swallowing  Respiratory: Positive for chest tightness  Negative for shortness of breath and wheezing  Gastrointestinal: Negative for diarrhea and vomiting  Neurological: Negative for headaches           Current Medications       Current Outpatient Medications:     benzonatate (TESSALON) 200 MG capsule, Take 1 capsule (200 mg total) by mouth 3 (three) times a day as needed for cough, Disp: 20 capsule, Rfl: 0    LORazepam (ATIVAN) 0 5 mg tablet, Take 1 tablet (0 5 mg total) by mouth 2 (two) times a day as needed for anxiety or sedation, Disp: 30 tablet, Rfl: 0    predniSONE 20 mg tablet, Take 2 tablets (40 mg total) by mouth daily for 5 days, Disp: 10 tablet, Rfl: 0    albuterol (PROVENTIL HFA,VENTOLIN HFA) 90 mcg/act inhaler, Inhale 2 puffs every 6 (six) hours as needed for wheezing or shortness of breath (Patient not taking: Reported on 2021), Disp: 1 Inhaler, Rfl: 1    Current Allergies Allergies as of 01/25/2021 - Reviewed 01/25/2021   Allergen Reaction Noted    Azithromycin Other (See Comments) 02/23/2018    Codeine Hives 02/23/2018    Keflex [cephalexin] Other (See Comments) 02/23/2018    Promethazine Other (See Comments) 02/23/2018    Sulfamethoxazole-trimethoprim Hives 02/23/2018            The following portions of the patient's history were reviewed and updated as appropriate: allergies, current medications, past family history, past medical history, past social history, past surgical history and problem list      Past Medical History:   Diagnosis Date    Anxiety 2/17/2020    Overweight 2/23/2018    SOB (shortness of breath)        History reviewed  No pertinent surgical history  Family History   Problem Relation Age of Onset    Diabetes Mother     Hypertension Mother     No Known Problems Father     No Known Problems Sister     No Known Problems Brother     No Known Problems Son     No Known Problems Maternal Grandmother     No Known Problems Maternal Grandfather     Heart disease Paternal Grandmother     Heart disease Paternal Grandfather     No Known Problems Brother          Medications have been verified  Objective   Pulse (!) 117   Temp 97 9 °F (36 6 °C)   Resp 20   LMP 01/25/2021   SpO2 93% Comment: in room sitting  Patient's last menstrual period was 01/25/2021  Physical Exam     Physical Exam  Constitutional:       Appearance: She is not ill-appearing or diaphoretic  HENT:      Right Ear: Tympanic membrane and ear canal normal       Left Ear: Tympanic membrane and ear canal normal       Nose: No rhinorrhea  Mouth/Throat:      Pharynx: No posterior oropharyngeal erythema  Cardiovascular:      Rate and Rhythm: Regular rhythm  Heart sounds: Normal heart sounds  Pulmonary:      Effort: Pulmonary effort is normal       Breath sounds: No wheezing  Comments: Minimally diminished breath sounds on lower lobes   Patient unable to fully take a deep breath bc she states it is sore with deep breathing  Neurological:      Mental Status: She is alert

## 2021-01-25 NOTE — PATIENT INSTRUCTIONS
Fever in Adults   WHAT YOU NEED TO KNOW:   A fever is an increase in your body temperature  Normal body temperature is 98 6°F (37°C)  Fever is generally defined as greater than 100 4°F (38°C)  Common causes include an infection, injury, or disease such as arthritis  DISCHARGE INSTRUCTIONS:   Return to the emergency department if:   · Your fever does not go away or gets worse even after treatment  · You have a stiff neck and a bad headache  · You are confused  You may not be able to think clearly or remember things like you normally do  · Your heart beats faster than usual even after treatment  · You have shortness of breath or chest pain when you breathe  · You urinate small amounts or not at all  · Your skin, lips, or nails turn blue  Contact your healthcare provider if:   · You have abdominal pain or you feel bloated  · You have nausea or are vomiting  · You have pain or burning when you urinate, or you have pain in your back  · You have questions or concerns about your condition or care  Medicines: You may need any of the following:  · NSAIDs , such as ibuprofen, help decrease swelling, pain, and fever  This medicine is available with or without a doctor's order  NSAIDs can cause stomach bleeding or kidney problems in certain people  If you take blood thinner medicine, always ask if NSAIDs are safe for you  Always read the medicine label and follow directions  Do not give these medicines to children under 10months of age without direction from your child's healthcare provider  · Acetaminophen  decreases pain and fever  It is available without a doctor's order  Ask how much to take and how often to take it  Follow directions  Read the labels of all other medicines you are using to see if they also contain acetaminophen, or ask your doctor or pharmacist  Acetaminophen can cause liver damage if not taken correctly   Do not use more than 4 grams (4,000 milligrams) total of acetaminophen in one day  · Antibiotics  may be given if you have an infection caused by bacteria  · Take your medicine as directed  Contact your healthcare provider if you think your medicine is not helping or if you have side effects  Tell him of her if you are allergic to any medicine  Keep a list of the medicines, vitamins, and herbs you take  Include the amounts, and when and why you take them  Bring the list or the pill bottles to follow-up visits  Carry your medicine list with you in case of an emergency  Follow up with your healthcare provider as directed:  Write down your questions so you remember to ask them during your visits  Self-care:   · Drink more liquids as directed  A fever makes you sweat  This can increase your risk for dehydration  Liquids can help prevent dehydration  ? Drink at least 6 to 8 eight-ounce cups of clear liquids each day  Drink water, juice, or broth  Do not drink sports drinks  They may contain caffeine  ? Ask your healthcare provider if you should drink an oral rehydration solution (ORS)  An ORS has the right amounts of water, salts, and sugar you need to replace body fluids  · Dress in lightweight clothes  Shivers may be a sign that your fever is rising  Do not put extra blankets or clothes on  This may cause your fever to rise even higher  Dress in light, comfortable clothing  Use a lightweight blanket or sheet when you sleep  Change your clothes, blanket, or sheets if they get wet  · Cool yourself safely  Take a bath in cool or lukewarm water  Use an ice pack wrapped in a small towel or wet a washcloth with cool water  Place the ice pack or wet washcloth on your forehead or the back of your neck  © Copyright [address] Information is for End User's use only and may not be sold, redistributed or otherwise used for commercial purposes   All illustrations and images included in CareNotes® are the copyrighted property of [employer]  or [address]  The above information is an  only  It is not intended as medical advice for individual conditions or treatments  Talk to your doctor, nurse or pharmacist before following any medical regimen to see if it is safe and effective for you

## 2021-02-06 DIAGNOSIS — F41.9 ANXIETY: ICD-10-CM

## 2021-02-08 RX ORDER — LORAZEPAM 0.5 MG/1
0.5 TABLET ORAL 2 TIMES DAILY PRN
Qty: 30 TABLET | Refills: 0 | Status: SHIPPED | OUTPATIENT
Start: 2021-02-08

## 2021-02-08 NOTE — TELEPHONE ENCOUNTER
Medication:  PDMP   01/06/2021  1  01/06/2021  LORAZEPAM 0 5 MG TABLET  30 0  15  LO CIM       Active agreement on file -No

## 2021-07-02 ENCOUNTER — TELEPHONE (OUTPATIENT)
Dept: FAMILY MEDICINE CLINIC | Facility: CLINIC | Age: 41
End: 2021-07-02

## 2021-07-02 NOTE — TELEPHONE ENCOUNTER
Pt will be going on a cruise in August  Requesting a patch to help with motion sickness  Pt wasn't sure if she needed an appt or not  Pt is scheduled for an appt on 7/6         Pharmacy:   [facility] [id number] [facility] [address], [address] [facility] [address] [place] [id number]  Phone: [phone] Fax: [phone]

## 2021-07-06 NOTE — PROGRESS NOTES
Assessment/Plan:    Pap with high risk HPV testing every 5 years-due 2023  Sexually transmitted infection testing as indicated  GC/CT done today  Exercise most days of week-minimum of 150 minutes per week  Obtain appropriate diet and hydration  Calcium 1000mg + 600 vit D daily  Insert nuva ring on day one of next menses  (ACHES reviewed)  Benefits, risks and alternatives discussed/reviewed  HPV 9 vaccine recommended through age 39  Check with your insurance for coverage  If covered, call office to schedule start of vaccine series  Annual mammogram starting at age 36, monthly breast self exam  [employer] 20 times twice daily  Instructed on how to perform a kegel  If unable, call me for a referral to PFPT  Bacterial vaginosis noted on wet mount  Rx sent to requested pharmacy  No sex during treatment  Complete all medication as directed  Consider daily probiotic  Call with any recurrence of symptoms  Always condom use encouraged  Return to office in 3 months for nuva ring check  Diagnoses and all orders for this visit:    Encntr for gyn exam (general) (routine) w abnormal findings    Encounter for Papanicolaou smear for cervical cancer screening    Bulky or enlarged uterus  -     US pelvis complete w transvaginal; Future    Pelvic pain in female  -     US pelvis complete w transvaginal; Future    Screening for STD (sexually transmitted disease)  -     Chlamydia/GC amplified DNA by PCR  -     POCT wet mount    BV (bacterial vaginosis)  -     POCT wet mount  -     metroNIDAZOLE (FLAGYL) 500 mg tablet; Take 1 tablet (500 mg total) by mouth every 12 (twelve) hours for 7 days    Encounter for initial prescription of vaginal ring hormonal contraceptive  -     etonogestrel-ethinyl estradiol (NUVARING) 0 12-0 015 MG/24HR vaginal ring; Insert vaginally and leave in place for 3 consecutive weeks, then remove for 1 week  Subjective:      Patient ID: Meche Morrison is a 47 y o  female      Crystal Hernando Carrera is a 47 y o   (14 yo son) female who is here today for her annual visit  C/o vaginal itching  Increased white colored vaginal discharge  She c/o ovulatory pain currently  Uses a one day monistat last week with slight improvement of symptoms but not complete resolution  Monthly menses x 5-7 days with heavy flow x 2 days with clots then mod to light flow  Menses is not acceptable due to heaviness of flow and would like to start birth control  She states this has been an issues since she was previously evaluated by Dr Reyes Rouse in 2018  Pelvic US ordered at that time but never completed  Previously on sprintec (disliked due to diarrhea), patch and nuva ring  Macel Razor is sexually active and  dating  No consistent partner  She did not use a condom once and would like STI testing   from   Normal pap with negative HR HPV 11/18  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 [place]  She will be there the next 8 months  Plans to move there at some point  Her son does not want to move  Previously employed by [employer]  The following portions of the patient's history were reviewed and updated as appropriate: allergies, current medications, past family history, past medical history, past social history, past surgical history and problem list     Review of Systems   Constitutional: Negative  Negative for activity change, appetite change, chills, diaphoresis, fatigue, fever and unexpected weight change  HENT: Negative for congestion, dental problem, sneezing, sore throat and trouble swallowing  Eyes: Negative for visual disturbance  Respiratory: Negative for chest tightness and shortness of breath  Cardiovascular: Negative for chest pain and leg swelling  Gastrointestinal: Negative for abdominal pain, constipation, diarrhea, nausea and vomiting  Genitourinary: Positive for menstrual problem and vaginal discharge   Negative for difficulty urinating, dyspareunia, dysuria, frequency, hematuria, pelvic pain, urgency, vaginal bleeding and vaginal pain  Vaginal itching   Musculoskeletal: Negative for back pain and neck pain  Skin: Negative  Allergic/Immunologic: Negative  Neurological: Negative for weakness and headaches  Hematological: Negative for adenopathy  Psychiatric/Behavioral: Negative  Objective:      /72 (BP Location: Left arm, Patient Position: Sitting, Cuff Size: Large)   Ht 5' 7 5" (1 715 m)   Wt 90 7 kg (200 lb)   LMP 06/23/2021   BMI 30 86 kg/m²          Physical Exam  Vitals and nursing note reviewed  Constitutional:       Appearance: Normal appearance  She is well-developed  She is obese  HENT:      Head: Normocephalic and atraumatic  Eyes:      General:         Right eye: No discharge  Left eye: No discharge  Neck:      Thyroid: No thyromegaly  Trachea: Trachea normal    Cardiovascular:      Rate and Rhythm: Normal rate and regular rhythm  Heart sounds: Normal heart sounds  Pulmonary:      Effort: Pulmonary effort is normal       Breath sounds: Normal breath sounds  Chest:      Breasts: Breasts are symmetrical          Right: Normal  No inverted nipple, mass, nipple discharge, skin change or tenderness  Left: Normal  No inverted nipple, mass, nipple discharge, skin change or tenderness  Abdominal:      Palpations: Abdomen is soft  Genitourinary:     General: Normal vulva  Exam position: Lithotomy position  Labia:         Right: No rash, tenderness, lesion or injury  Left: No rash, tenderness, lesion or injury  Urethra: No prolapse, urethral pain, urethral swelling or urethral lesion  Vagina: No signs of injury and foreign body  Vaginal discharge (thick white discharge) present  No erythema, tenderness or bleeding  Cervix: No cervical motion tenderness, discharge or friability  Uterus: Enlarged (bulky)  Not deviated, not fixed and not tender  Adnexa: Right adnexa normal and left adnexa normal         Right: No mass, tenderness or fullness  Left: No mass, tenderness or fullness  Rectum: No external hemorrhoid  Comments: Unable to perform a kegel  Musculoskeletal:         General: Normal range of motion  Cervical back: Normal range of motion and neck supple  Lymphadenopathy:      Head:      Right side of head: No submental, submandibular or tonsillar adenopathy  Left side of head: No submental, submandibular or tonsillar adenopathy  Cervical: No cervical adenopathy  Upper Body:      Right upper body: No supraclavicular or axillary adenopathy  Left upper body: No supraclavicular or axillary adenopathy  Lower Body: No right inguinal adenopathy  No left inguinal adenopathy  Skin:     General: Skin is warm and dry  Neurological:      Mental Status: She is alert and oriented to person, place, and time     Psychiatric:         Mood and Affect: Mood normal          Behavior: Behavior normal

## 2021-07-08 ENCOUNTER — ANNUAL EXAM (OUTPATIENT)
Dept: OBGYN CLINIC | Facility: CLINIC | Age: 41
End: 2021-07-08
Payer: COMMERCIAL

## 2021-07-08 VITALS
BODY MASS INDEX: 30.31 KG/M2 | SYSTOLIC BLOOD PRESSURE: 118 MMHG | HEIGHT: 68 IN | DIASTOLIC BLOOD PRESSURE: 72 MMHG | WEIGHT: 200 LBS

## 2021-07-08 DIAGNOSIS — B96.89 BV (BACTERIAL VAGINOSIS): ICD-10-CM

## 2021-07-08 DIAGNOSIS — R10.2 PELVIC PAIN IN FEMALE: ICD-10-CM

## 2021-07-08 DIAGNOSIS — Z30.015 ENCOUNTER FOR INITIAL PRESCRIPTION OF VAGINAL RING HORMONAL CONTRACEPTIVE: ICD-10-CM

## 2021-07-08 DIAGNOSIS — N76.0 BV (BACTERIAL VAGINOSIS): ICD-10-CM

## 2021-07-08 DIAGNOSIS — Z11.3 SCREENING FOR STD (SEXUALLY TRANSMITTED DISEASE): ICD-10-CM

## 2021-07-08 DIAGNOSIS — N85.2 BULKY OR ENLARGED UTERUS: ICD-10-CM

## 2021-07-08 DIAGNOSIS — Z12.4 ENCOUNTER FOR PAPANICOLAOU SMEAR FOR CERVICAL CANCER SCREENING: ICD-10-CM

## 2021-07-08 DIAGNOSIS — Z01.411 ENCNTR FOR GYN EXAM (GENERAL) (ROUTINE) W ABNORMAL FINDINGS: Primary | ICD-10-CM

## 2021-07-08 LAB
BV WHIFF TEST VAG QL: ABNORMAL
CLUE CELLS SPEC QL WET PREP: ABNORMAL
PH SMN: ABNORMAL [PH]
SL AMB POCT WET MOUNT: ABNORMAL
T VAGINALIS VAG QL WET PREP: ABNORMAL
YEAST VAG QL WET PREP: ABNORMAL

## 2021-07-08 PROCEDURE — 87491 CHLMYD TRACH DNA AMP PROBE: CPT | Performed by: NURSE PRACTITIONER

## 2021-07-08 PROCEDURE — 87591 N.GONORRHOEAE DNA AMP PROB: CPT | Performed by: NURSE PRACTITIONER

## 2021-07-08 PROCEDURE — S0612 ANNUAL GYNECOLOGICAL EXAMINA: HCPCS | Performed by: NURSE PRACTITIONER

## 2021-07-08 PROCEDURE — 87210 SMEAR WET MOUNT SALINE/INK: CPT | Performed by: NURSE PRACTITIONER

## 2021-07-08 RX ORDER — ETONOGESTREL AND ETHINYL ESTRADIOL 11.7; 2.7 MG/1; MG/1
INSERT, EXTENDED RELEASE VAGINAL
Qty: 3 EACH | Refills: 0 | Status: SHIPPED | OUTPATIENT
Start: 2021-07-08 | End: 2021-07-13

## 2021-07-08 RX ORDER — METRONIDAZOLE 500 MG/1
500 TABLET ORAL EVERY 12 HOURS SCHEDULED
Qty: 14 TABLET | Refills: 0 | Status: SHIPPED | OUTPATIENT
Start: 2021-07-08 | End: 2021-07-15

## 2021-07-08 NOTE — PATIENT INSTRUCTIONS
Pap with high risk HPV testing every 5 years-due 2023  Sexually transmitted infection testing as indicated  GC/CT done today  Exercise most days of week-minimum of 150 minutes per week  Obtain appropriate diet and hydration  Calcium 1000mg + 600 vit D daily  Insert nuva ring on day one of next menses  (ACHES reviewed)  Benefits, risks and alternatives discussed/reviewed  HPV 9 vaccine recommended through age 39  Check with your insurance for coverage  If covered, call office to schedule start of vaccine series  Annual mammogram starting at age 36, monthly breast self exam  [employer] 20 times twice daily  Bacterial vaginosis noted on wet mount  Rx sent to requested pharmacy  No sex during treatment  Complete all medication as directed  Consider daily probiotic  Call with any recurrence of symptoms  Always condom use encouraged  Return to office in 3 months for nuva ring check

## 2021-07-11 LAB
C TRACH DNA SPEC QL NAA+PROBE: NEGATIVE
N GONORRHOEA DNA SPEC QL NAA+PROBE: NEGATIVE

## 2021-07-12 ENCOUNTER — PATIENT MESSAGE (OUTPATIENT)
Dept: OBGYN CLINIC | Facility: CLINIC | Age: 41
End: 2021-07-12

## 2021-07-12 DIAGNOSIS — N76.0 BV (BACTERIAL VAGINOSIS): Primary | ICD-10-CM

## 2021-07-12 DIAGNOSIS — B96.89 BV (BACTERIAL VAGINOSIS): Primary | ICD-10-CM

## 2021-07-12 RX ORDER — CLINDAMYCIN PHOSPHATE 20 MG/G
1 CREAM VAGINAL
Qty: 40 G | Refills: 0 | Status: SHIPPED | OUTPATIENT
Start: 2021-07-12 | End: 2021-07-13

## 2021-07-12 NOTE — TELEPHONE ENCOUNTER
Spoke with pt  States while taking metronidazole she was having severe back/neck pain  Did not have itching, rash, or trouble breathing  Pt  Would prefer an alternative treatment  Advised pt  The other option would be a vaginal gel  Pt  States that is okay  Would like medication sent to [facility] in [place]  Pharmacy in system

## 2021-07-13 DIAGNOSIS — Z30.015 ENCOUNTER FOR INITIAL PRESCRIPTION OF VAGINAL RING HORMONAL CONTRACEPTIVE: ICD-10-CM

## 2021-07-13 DIAGNOSIS — N76.0 BV (BACTERIAL VAGINOSIS): ICD-10-CM

## 2021-07-13 DIAGNOSIS — B96.89 BV (BACTERIAL VAGINOSIS): ICD-10-CM

## 2021-07-13 RX ORDER — CLINDAMYCIN PHOSPHATE 20 MG/G
1 CREAM VAGINAL
Qty: 40 G | Refills: 0 | Status: SHIPPED | OUTPATIENT
Start: 2021-07-13 | End: 2021-07-18

## 2021-07-13 RX ORDER — ETONOGESTREL AND ETHINYL ESTRADIOL 11.7; 2.7 MG/1; MG/1
INSERT, EXTENDED RELEASE VAGINAL
Qty: 3 EACH | Refills: 0 | Status: SHIPPED | OUTPATIENT
Start: 2021-07-13

## 2021-09-29 ENCOUNTER — PATIENT MESSAGE (OUTPATIENT)
Dept: OBGYN CLINIC | Facility: CLINIC | Age: 41
End: 2021-09-29

## 2021-09-29 NOTE — TELEPHONE ENCOUNTER
Hi! Please see MyChart message from pt  Are you able to address this for this pt? Christos Henry is out of the office today  I could not send over as a refill request, because therapy was discontinued      This is the   clindamycin (CLEOCIN) 2 % vaginal cream [[id number]]  ENDED    Order Details  Dose: 1 applicator Route: Vaginal Frequency: Daily at bedtime   Dispense Quantity: 40 g Refills: 0          Sig: Insert 1 applicator into the vagina daily at bedtime for 5 days

## 2021-09-30 DIAGNOSIS — N76.0 BV (BACTERIAL VAGINOSIS): Primary | ICD-10-CM

## 2021-09-30 DIAGNOSIS — B96.89 BV (BACTERIAL VAGINOSIS): ICD-10-CM

## 2021-09-30 DIAGNOSIS — B96.89 BV (BACTERIAL VAGINOSIS): Primary | ICD-10-CM

## 2021-09-30 DIAGNOSIS — N76.0 BV (BACTERIAL VAGINOSIS): ICD-10-CM

## 2021-09-30 RX ORDER — CLINDAMYCIN PHOSPHATE 20 MG/G
1 CREAM VAGINAL
Qty: 40 G | Refills: 0 | Status: SHIPPED | OUTPATIENT
Start: 2021-09-30 | End: 2021-10-05

## 2021-09-30 RX ORDER — CLINDAMYCIN PHOSPHATE 20 MG/G
1 CREAM VAGINAL
Qty: 40 G | Refills: 0 | Status: SHIPPED | OUTPATIENT
Start: 2021-09-30 | End: 2021-09-30

## 2022-03-17 DIAGNOSIS — T75.3XXA MOTION SICKNESS, INITIAL ENCOUNTER: ICD-10-CM

## 2022-03-17 RX ORDER — SCOLOPAMINE TRANSDERMAL SYSTEM 1 MG/1
1 PATCH, EXTENDED RELEASE TRANSDERMAL
Qty: 4 PATCH | Refills: 0 | Status: SHIPPED | OUTPATIENT
Start: 2022-03-17

## 2022-03-17 NOTE — TELEPHONE ENCOUNTER
Spoke with patient to inform her she has not been seen by you since 2020 and would need an appointment to re-establish care here in the office with a provider  Patient stated last time she requested these you sent them for her, she asked that I send it to you to refill  Patient is currently in [place]